# Patient Record
Sex: FEMALE | Race: WHITE | Employment: UNEMPLOYED | URBAN - METROPOLITAN AREA
[De-identification: names, ages, dates, MRNs, and addresses within clinical notes are randomized per-mention and may not be internally consistent; named-entity substitution may affect disease eponyms.]

---

## 2017-12-30 ENCOUNTER — HOSPITAL ENCOUNTER (EMERGENCY)
Age: 57
Discharge: HOME | End: 2017-12-30
Attending: EMERGENCY MEDICINE
Payer: MEDICARE

## 2017-12-30 VITALS
BODY MASS INDEX: 22.32 KG/M2 | DIASTOLIC BLOOD PRESSURE: 76 MMHG | TEMPERATURE: 98.3 F | HEART RATE: 63 BPM | SYSTOLIC BLOOD PRESSURE: 117 MMHG | RESPIRATION RATE: 16 BRPM | OXYGEN SATURATION: 98 % | WEIGHT: 126 LBS | HEIGHT: 63 IN

## 2017-12-30 DIAGNOSIS — R21 RASH AND OTHER NONSPECIFIC SKIN ERUPTION: Primary | ICD-10-CM

## 2017-12-30 PROCEDURE — 99283 EMERGENCY DEPT VISIT LOW MDM: CPT

## 2017-12-30 PROCEDURE — 74011250637 HC RX REV CODE- 250/637: Performed by: EMERGENCY MEDICINE

## 2017-12-30 RX ORDER — MUPIROCIN 20 MG/G
OINTMENT TOPICAL 2 TIMES DAILY
Qty: 22 G | Refills: 0 | Status: ON HOLD | OUTPATIENT
Start: 2017-12-30 | End: 2018-02-14

## 2017-12-30 RX ORDER — MUPIROCIN 20 MG/G
OINTMENT TOPICAL
Status: COMPLETED | OUTPATIENT
Start: 2017-12-30 | End: 2017-12-30

## 2017-12-30 RX ADMIN — MUPIROCIN: 20 OINTMENT TOPICAL at 12:55

## 2017-12-30 NOTE — ED PROVIDER NOTES
HPI Comments: 61 y.o. female with past medical history significant for hypercholesterolemia, asthma, and HTN who presents from home with chief complaint of a skin problem. Pt reports she was discharged yesterday after a 5-day admission for diverticulitis. Last night she scratched the skin over her abdominal LLQ when the \"skin came up\" over her LLQ which began draining. She also c/o recent cough. When asked about previous abdominal surgery, pt notes she had a tummytuck 10 years ago. Pt denies recent surgery. There are no other acute medical concerns at this time. PCP: Saritha Ba MD    Note written by Vonda Grider, as dictated by Adolfo Whaley MD 11:46 AM    The history is provided by the patient. Past Medical History:   Diagnosis Date    Abdominal pain 5/3/2013    Anxiety     Arthritis     Asthma     Depression     Hypercholesterolemia     Hypertension     PRIOR TO GASTRIC BYPASS    Joint pain     Kidney stones     Murmur, heart     Muscle ache     and pain    Nausea & vomiting     Other ill-defined conditions(799.89) 2013    \"BAD BRONCHITIS & PNEUMONIA\"    Other ill-defined conditions(799.89)     MITRAL VALVE PROLAPSE. DX. WHEN LITTLE.  Psychiatric disorder     DEPRESSION. BIPOLAR.  Status following gastric bypass for weight loss 5/3/2013    In  in [place], [place] with Dr. Derek Bojorquez.  Trouble in sleeping     Unspecified adverse effect of anesthesia     \"WOKE  [address] SO TERRIFIED\"    Unspecified adverse effect of anesthesia     LAST COUPLE SURGERIES, FIBEROPTIC USED. THROAT HAS CURVE.      Unspecified adverse effect of anesthesia     states need fibro optic for intubation due to 'curature in throat'       Past Surgical History:   Procedure Laterality Date    HX  SECTION       x 2    HX CHOLECYSTECTOMY      HX GASTRIC BYPASS  mid     in [place] HX GI  13    lap SBR w/ reconstruction of Jej., resection of peritoneal mass by Dr Aiden Houser      X 3 HERNIAS REPAIRED. UNSURE WHICH KIND.  HX ORTHOPAEDIC      RIGHT KNEE SURGERY AT AGE 11 YRS.    HX ORTHOPAEDIC      LT ANKLE FX. REPAIR. 6 SCREWS    HX OTHER SURGICAL      \"strangled bowel\"    HX OTHER SURGICAL      ABDOMINOPLASTY WITH INTERCEPTED HERNIAS X 4-5 DONE WITH MESH PLACEMENT    NEUROLOGICAL PROCEDURE UNLISTED  2011    CERVICAL FUSION. \"UNABLE TO MOVE HEAD SIDE TO SIDE, UP OR BACK\" PER PT. Family History:   Problem Relation Age of Onset    Cancer Mother      breast    Post-op Nausea/Vomiting Mother     Depression Mother     Cancer Father      prostate    Heart Disease Brother      MI    Anesth Problems Neg Hx        Social History     Social History    Marital status: SINGLE     Spouse name: N/A    Number of children: N/A    Years of education: N/A     Occupational History    Not on file. Social History Main Topics    Smoking status: Never Smoker    Smokeless tobacco: Never Used    Alcohol use No    Drug use: No    Sexual activity: Not on file     Other Topics Concern    Not on file     Social History Narrative         ALLERGIES: Latex; Erythromycin; Morphine; Pcn [penicillins]; Tape [adhesive]; and Tetracycline    Review of Systems   Constitutional: Negative for chills and fever. HENT: Negative for sore throat. Respiratory: Negative for cough and shortness of breath. Cardiovascular: Negative for chest pain. Gastrointestinal: Negative for abdominal pain and vomiting. Genitourinary: Negative for dysuria. Musculoskeletal: Negative for back pain. Skin: Positive for color change and wound. Negative for rash. Neurological: Negative for syncope and headaches. Psychiatric/Behavioral: Negative for confusion. All other systems reviewed and are negative.       Vitals:    12/30/17 1107   BP: 119/78   Pulse: 92   Resp: 18   Temp: 97.8 °F (36.6 °C)   SpO2: 98%   Weight: 57.2 kg (126 lb)   Height: 5' 3\" (1.6 m)            Physical Exam   Constitutional: She is oriented to person, place, and time. She appears well-developed and well-nourished. No distress. HENT:   Head: Normocephalic and atraumatic. Eyes: Pupils are equal, round, and reactive to light. Neck: Normal range of motion. Neck supple. Cardiovascular: Normal rate, regular rhythm and normal heart sounds. Exam reveals no gallop and no friction rub. No murmur heard. Pulmonary/Chest: Effort normal and breath sounds normal. No respiratory distress. She has no wheezes. Abdominal: Soft. Bowel sounds are normal. She exhibits no distension. There is no tenderness. There is no rebound and no guarding. Well headled scar on    Musculoskeletal: Normal range of motion. Neurological: She is alert and oriented to person, place, and time. Skin: Skin is warm. She is not diaphoretic. There is erythema. 3 x 6 cm blistering lesion over LLQ with surrounding erythema. and clear fluid drainage. Psychiatric: She has a normal mood and affect. Her behavior is normal. Judgment and thought content normal.   Nursing note and vitals reviewed. Note written by Vonda Cabral, as dictated by Katheryn DeL a Vega MD 11:54 AM        [facility]  ED Course   This is a 59-year-old female with past medical history, review of systems, physical exam as above, presenting with complaints of rash on the left lower extremity, characterized as blistering, leaking fluid. She states she was discharged out of the hospital yesterday, after stay for diverticulitis, extensive abdominal surgical history including gastric bypass. He denies presence of rash prior to going to bed last night but woke up with approximately 3 x 6 cm area of blistering rash this morning, as shown in the chart. Physical exam unremarkable for rash as photographed, erythematous, nonblanching, without other areas of involvement, no oral involvement, patient noted to be afebrile, without tachycardia.  Area of rash and blister that has opened does not appear to involve previous surgical scars. Differential includes dermatitis, Michael-Larry syndrome, toxic shock syndrome. Patient has been receiving IV levaquin and metronidazole for 5d, now converted to PO. Will advise patient to DC levaquin and lamotrigine, shadi the rash with a surgical pen, offer strict return precautions for worsening rash of mucous membrane involvement.     Procedures

## 2017-12-30 NOTE — DISCHARGE INSTRUCTIONS
Rash: Care Instructions  Your Care Instructions  A rash is any irritation or inflammation of the skin. Rashes have many possible causes, including allergy, infection, illness, heat, and emotional stress. Follow-up care is a key part of your treatment and safety. Be sure to make and go to all appointments, and call your doctor if you are having problems. It's also a good idea to know your test results and keep a list of the medicines you take. How can you care for yourself at home? · Wash the area with water only. Soap can make dryness and itching worse. Pat dry. · Put cold, wet cloths on the rash to reduce itching. · Keep cool, and stay out of the sun. · Leave the rash open to the air as much of the time as possible. · Sometimes petroleum jelly (Vaseline) can help relieve the discomfort caused by a rash. A moisturizing lotion, such as Cetaphil, also may help. Calamine lotion may help for rashes caused by contact with something (such as a plant or soap) that irritated the skin. Use it 3 or 4 times a day. · If your doctor prescribed a cream, use it as directed. If your doctor prescribed medicine, take it exactly as directed. · If your rash itches so badly that it interferes with your normal activities, take an over-the-counter antihistamine, such as diphenhydramine (Benadryl) or loratadine (Claritin). Read and follow all instructions on the label. When should you call for help? Call your doctor now or seek immediate medical care if:  ? · You have signs of infection, such as:  ¨ Increased pain, swelling, warmth, or redness. ¨ Red streaks leading from the area. ¨ Pus draining from the area. ¨ A fever. ? · You have joint pain along with the rash. ? Watch closely for changes in your health, and be sure to contact your doctor if:  ? · Your rash is changing or getting worse. For example, call if you have pain along with the rash, the rash is spreading, or you have new blisters.    ? · You do not get better after 1 week. Where can you learn more? Go to http://thea-master.info/. Enter L139 in the search box to learn more about \"Rash: Care Instructions. \"  Current as of: October 13, 2016  Content Version: 11.4  © 7874-1576 [employer]. Care instructions adapted under license by [employer] (which disclaims liability or warranty for this information). If you have questions about a medical condition or this instruction, always ask your healthcare professional. [address] any warranty or liability for your use of this information.

## 2018-01-08 ENCOUNTER — HOSPITAL ENCOUNTER (OUTPATIENT)
Dept: CT IMAGING | Age: 58
Discharge: HOME | End: 2018-01-08
Attending: COLON & RECTAL SURGERY
Payer: MEDICARE

## 2018-01-08 DIAGNOSIS — K57.32 DIVERTICULITIS, COLON: ICD-10-CM

## 2018-01-08 PROCEDURE — 74011000258 HC RX REV CODE- 258: Performed by: COLON & RECTAL SURGERY

## 2018-01-08 PROCEDURE — 74177 CT ABD & PELVIS W/CONTRAST: CPT

## 2018-01-08 PROCEDURE — 74011636320 HC RX REV CODE- 636/320: Performed by: COLON & RECTAL SURGERY

## 2018-01-08 RX ORDER — SODIUM CHLORIDE 0.9 % (FLUSH) 0.9 %
10 SYRINGE (ML) INJECTION
Status: COMPLETED | OUTPATIENT
Start: 2018-01-08 | End: 2018-01-08

## 2018-01-08 RX ADMIN — IOPAMIDOL 100 ML: 755 INJECTION, SOLUTION INTRAVENOUS at 18:34

## 2018-01-08 RX ADMIN — SODIUM CHLORIDE 100 ML: 900 INJECTION, SOLUTION INTRAVENOUS at 18:34

## 2018-01-08 RX ADMIN — IOHEXOL 50 ML: 240 INJECTION, SOLUTION INTRATHECAL; INTRAVASCULAR; INTRAVENOUS; ORAL at 18:34

## 2018-01-08 RX ADMIN — Medication 10 ML: at 18:34

## 2018-02-12 ENCOUNTER — APPOINTMENT (OUTPATIENT)
Dept: CT IMAGING | Age: 58
End: 2018-02-12
Attending: PHYSICIAN ASSISTANT
Payer: MEDICARE

## 2018-02-12 ENCOUNTER — HOSPITAL ENCOUNTER (EMERGENCY)
Age: 58
Discharge: HOME | End: 2018-02-12
Attending: EMERGENCY MEDICINE | Admitting: EMERGENCY MEDICINE
Payer: MEDICARE

## 2018-02-12 VITALS
HEART RATE: 73 BPM | RESPIRATION RATE: 14 BRPM | OXYGEN SATURATION: 100 % | HEIGHT: 65 IN | TEMPERATURE: 97.5 F | DIASTOLIC BLOOD PRESSURE: 62 MMHG | WEIGHT: 118 LBS | BODY MASS INDEX: 19.66 KG/M2 | SYSTOLIC BLOOD PRESSURE: 126 MMHG

## 2018-02-12 DIAGNOSIS — K59.00 CONSTIPATION, UNSPECIFIED CONSTIPATION TYPE: Primary | ICD-10-CM

## 2018-02-12 LAB
ALBUMIN SERPL-MCNC: 3.8 G/DL (ref 3.5–5)
ALBUMIN/GLOB SERPL: 1.2 {RATIO} (ref 1.1–2.2)
ALP SERPL-CCNC: 76 U/L (ref 45–117)
ALT SERPL-CCNC: 14 U/L (ref 12–78)
ANION GAP SERPL CALC-SCNC: 9 MMOL/L (ref 5–15)
APPEARANCE UR: CLEAR
AST SERPL-CCNC: 21 U/L (ref 15–37)
BACTERIA URNS QL MICRO: NEGATIVE /HPF
BASOPHILS # BLD: 0 K/UL (ref 0–0.1)
BASOPHILS NFR BLD: 1 % (ref 0–1)
BILIRUB SERPL-MCNC: 0.3 MG/DL (ref 0.2–1)
BILIRUB UR QL: NEGATIVE
BUN SERPL-MCNC: 10 MG/DL (ref 6–20)
BUN/CREAT SERPL: 13 (ref 12–20)
CALCIUM SERPL-MCNC: 8.8 MG/DL (ref 8.5–10.1)
CHLORIDE SERPL-SCNC: 106 MMOL/L (ref 97–108)
CO2 SERPL-SCNC: 25 MMOL/L (ref 21–32)
COLOR UR: ABNORMAL
CREAT SERPL-MCNC: 0.78 MG/DL (ref 0.55–1.02)
DIFFERENTIAL METHOD BLD: NORMAL
EOSINOPHIL # BLD: 0.1 K/UL (ref 0–0.4)
EOSINOPHIL NFR BLD: 1 % (ref 0–7)
EPITH CASTS URNS QL MICRO: ABNORMAL /LPF
ERYTHROCYTE [DISTWIDTH] IN BLOOD BY AUTOMATED COUNT: 13.9 % (ref 11.5–14.5)
GLOBULIN SER CALC-MCNC: 3.2 G/DL (ref 2–4)
GLUCOSE SERPL-MCNC: 91 MG/DL (ref 65–100)
GLUCOSE UR STRIP.AUTO-MCNC: NEGATIVE MG/DL
HCT VFR BLD AUTO: 38.5 % (ref 35–47)
HGB BLD-MCNC: 12.1 G/DL (ref 11.5–16)
HGB UR QL STRIP: ABNORMAL
HYALINE CASTS URNS QL MICRO: ABNORMAL /LPF (ref 0–5)
IMM GRANULOCYTES # BLD: 0 K/UL (ref 0–0.04)
IMM GRANULOCYTES NFR BLD AUTO: 0 % (ref 0–0.5)
KETONES UR QL STRIP.AUTO: NEGATIVE MG/DL
LEUKOCYTE ESTERASE UR QL STRIP.AUTO: NEGATIVE
LIPASE SERPL-CCNC: 180 U/L (ref 73–393)
LYMPHOCYTES # BLD: 1.7 K/UL (ref 0.8–3.5)
LYMPHOCYTES NFR BLD: 27 % (ref 12–49)
MCH RBC QN AUTO: 27.4 PG (ref 26–34)
MCHC RBC AUTO-ENTMCNC: 31.4 G/DL (ref 30–36.5)
MCV RBC AUTO: 87.1 FL (ref 80–99)
MONOCYTES # BLD: 0.5 K/UL (ref 0–1)
MONOCYTES NFR BLD: 7 % (ref 5–13)
NEUTS SEG # BLD: 4 K/UL (ref 1.8–8)
NEUTS SEG NFR BLD: 64 % (ref 32–75)
NITRITE UR QL STRIP.AUTO: NEGATIVE
NRBC # BLD: 0 K/UL (ref 0–0.01)
NRBC BLD-RTO: 0 PER 100 WBC
PH UR STRIP: 7.5 [PH] (ref 5–8)
PLATELET # BLD AUTO: 312 K/UL (ref 150–400)
PMV BLD AUTO: 11.9 FL (ref 8.9–12.9)
POTASSIUM SERPL-SCNC: 4 MMOL/L (ref 3.5–5.1)
PROT SERPL-MCNC: 7 G/DL (ref 6.4–8.2)
PROT UR STRIP-MCNC: NEGATIVE MG/DL
RBC # BLD AUTO: 4.42 M/UL (ref 3.8–5.2)
RBC #/AREA URNS HPF: ABNORMAL /HPF (ref 0–5)
SODIUM SERPL-SCNC: 140 MMOL/L (ref 136–145)
SP GR UR REFRACTOMETRY: 1.01 (ref 1–1.03)
UR CULT HOLD, URHOLD: NORMAL
UROBILINOGEN UR QL STRIP.AUTO: 0.2 EU/DL (ref 0.2–1)
WBC # BLD AUTO: 6.3 K/UL (ref 3.6–11)
WBC URNS QL MICRO: ABNORMAL /HPF (ref 0–4)

## 2018-02-12 PROCEDURE — 74011250636 HC RX REV CODE- 250/636: Performed by: PHYSICIAN ASSISTANT

## 2018-02-12 PROCEDURE — 36415 COLL VENOUS BLD VENIPUNCTURE: CPT | Performed by: PHYSICIAN ASSISTANT

## 2018-02-12 PROCEDURE — 83690 ASSAY OF LIPASE: CPT | Performed by: PHYSICIAN ASSISTANT

## 2018-02-12 PROCEDURE — 74011636320 HC RX REV CODE- 636/320: Performed by: RADIOLOGY

## 2018-02-12 PROCEDURE — 96361 HYDRATE IV INFUSION ADD-ON: CPT

## 2018-02-12 PROCEDURE — 81001 URINALYSIS AUTO W/SCOPE: CPT | Performed by: PHYSICIAN ASSISTANT

## 2018-02-12 PROCEDURE — 96374 THER/PROPH/DIAG INJ IV PUSH: CPT

## 2018-02-12 PROCEDURE — 80053 COMPREHEN METABOLIC PANEL: CPT | Performed by: PHYSICIAN ASSISTANT

## 2018-02-12 PROCEDURE — 96375 TX/PRO/DX INJ NEW DRUG ADDON: CPT

## 2018-02-12 PROCEDURE — 74177 CT ABD & PELVIS W/CONTRAST: CPT

## 2018-02-12 PROCEDURE — 85025 COMPLETE CBC W/AUTO DIFF WBC: CPT | Performed by: PHYSICIAN ASSISTANT

## 2018-02-12 PROCEDURE — 99283 EMERGENCY DEPT VISIT LOW MDM: CPT

## 2018-02-12 RX ORDER — HYDROMORPHONE HYDROCHLORIDE 2 MG/ML
0.5 INJECTION, SOLUTION INTRAMUSCULAR; INTRAVENOUS; SUBCUTANEOUS
Status: DISCONTINUED | OUTPATIENT
Start: 2018-02-12 | End: 2018-02-13 | Stop reason: HOSPADM

## 2018-02-12 RX ORDER — ONDANSETRON 2 MG/ML
4 INJECTION INTRAMUSCULAR; INTRAVENOUS
Status: COMPLETED | OUTPATIENT
Start: 2018-02-12 | End: 2018-02-12

## 2018-02-12 RX ORDER — KETOROLAC TROMETHAMINE 30 MG/ML
30 INJECTION, SOLUTION INTRAMUSCULAR; INTRAVENOUS
Status: COMPLETED | OUTPATIENT
Start: 2018-02-12 | End: 2018-02-12

## 2018-02-12 RX ORDER — POLYETHYLENE GLYCOL 3350 17 G/17G
17 POWDER, FOR SOLUTION ORAL DAILY
Qty: 119 G | Refills: 0 | Status: SHIPPED | OUTPATIENT
Start: 2018-02-12 | End: 2020-02-11

## 2018-02-12 RX ADMIN — ONDANSETRON 4 MG: 2 INJECTION INTRAMUSCULAR; INTRAVENOUS at 18:19

## 2018-02-12 RX ADMIN — KETOROLAC TROMETHAMINE 30 MG: 30 INJECTION, SOLUTION INTRAMUSCULAR at 18:19

## 2018-02-12 RX ADMIN — IOPAMIDOL 100 ML: 755 INJECTION, SOLUTION INTRAVENOUS at 19:06

## 2018-02-12 RX ADMIN — SODIUM CHLORIDE 1000 ML: 900 INJECTION, SOLUTION INTRAVENOUS at 18:19

## 2018-02-12 NOTE — ED PROVIDER NOTES
HPI Comments: Sol Menadr is a 61 y.o. female  who presents by private vehicle to ER with c/o Patient presents with:  Abdominal Pain. Patient presents with complaints of lower abdominal pain with intermittent diarrhea and constipation. Patient reports she was admitted back in December for Diverticulitis. Patient scheduled to have colonoscopy by Dr. Radha Tyson on Thursday. Denies fever or chills. She specifically denies any fevers, chills, nausea, vomiting, chest pain, shortness of breath, headache, rash, diarrhea,  urinary/bowel changes, sweating or weight loss. PCP: Salma Martell MD   PMHx significant for: Past Medical History:  5/3/2013: Abdominal pain  No date: Anxiety  No date: Arthritis  No date: Asthma  No date: Depression  No date: Hypercholesterolemia  No date: Hypertension      Comment: PRIOR TO GASTRIC BYPASS  No date: Joint pain  No date: Kidney stones  No date: Murmur, heart  No date: Muscle ache      Comment: and pain  No date: Nausea & vomiting  2013: Other ill-defined conditions(799.89)      Comment: \"BAD BRONCHITIS & PNEUMONIA\"  No date: Other ill-defined conditions(799.89)      Comment: MITRAL VALVE PROLAPSE. DX. WHEN LITTLE. No date: Psychiatric disorder      Comment: DEPRESSION. BIPOLAR.  5/3/2013: Status following gastric bypass for weight loss      Comment: In  in [place], [place] with Dr. Raoul Hawthorne. No date: Trouble in sleeping  No date: Unspecified adverse effect of anesthesia      Comment: \"WOKE  [address] SO TERRIFIED\"  No date: Unspecified adverse effect of anesthesia      Comment: LAST COUPLE SURGERIES, FIBEROPTIC USED. THROAT               HAS CURVE.    No date: Unspecified adverse effect of anesthesia      Comment: states need fibro optic for intubation due to                'curature in throat'   PSHx significant for: Past Surgical History:  No date: HX  SECTION      Comment:  x 2  No date: HX CHOLECYSTECTOMY  mid : HX GASTRIC BYPASS      Comment: in [place]  5/8/13: HX GI      Comment: lap SBR w/ reconstruction of Jej., resection                of peritoneal mass by Dr Radhames Azevedo  No date: HX HERNIA REPAIR      Comment: X 3 HERNIAS REPAIRED. UNSURE WHICH KIND. No date: HX ORTHOPAEDIC      Comment: RIGHT KNEE SURGERY AT AGE 11 YRS. No date: HX ORTHOPAEDIC      Comment: LT ANKLE FX. REPAIR. 6 SCREWS  No date: HX OTHER SURGICAL      Comment: \"strangled bowel\"  No date: HX OTHER SURGICAL      Comment: ABDOMINOPLASTY WITH INTERCEPTED HERNIAS X 4-5                DONE WITH MESH PLACEMENT  2011: NEUROLOGICAL PROCEDURE UNLISTED      Comment: CERVICAL FUSION. \"UNABLE TO MOVE HEAD SIDE TO                SIDE, UP OR BACK\" PER PT. Social Hx: Tobacco use: Smoking status: Never Smoker                                                              Smokeless status: Never Used                      ; EtOH use: The patient states she drinks 0  per week.; Illicit Drug use: Allergies:   -- Latex -- Itching and Other (comments)    --  Oozing sores, rash   -- Erythromycin -- Itching    --  WITH RASH & GI ISSUES. -- Morphine -- Itching    --  Tolerates hydromorphone, hydrocodone and oxycodone   -- Pcn (Penicillins) -- Rash and Itching   -- Tape (Adhesive) -- Rash and Other (comments)    --  Oozing sores   -- Tetracycline -- Itching    There are no other complaints, changes or physical findings at this time. Patient is a 61 y.o. female presenting with abdominal pain. The history is provided by the patient. Abdominal Pain    This is a recurrent problem. The current episode started more than 2 days ago. The problem occurs constantly. The problem has not changed since onset. The pain is located in the suprapubic region, LLQ and RLQ. The quality of the pain is sharp. The pain is at a severity of 8/10. The pain is severe. Associated symptoms include nausea and constipation.  Pertinent negatives include no anorexia, no fever, no belching, no flatus, no vomiting, no dysuria, no hematuria, no arthralgias, no myalgias, no trauma, no chest pain, no testicular pain and no back pain. Nothing worsens the pain. The pain is relieved by nothing. Past workup includes CT scan. Her past medical history is significant for diverticulitis. The patient's surgical history non-contributory. Past Medical History:   Diagnosis Date    Abdominal pain 5/3/2013    Anxiety     Arthritis     Asthma     Depression     Hypercholesterolemia     Hypertension     PRIOR TO GASTRIC BYPASS    Joint pain     Kidney stones     Murmur, heart     Muscle ache     and pain    Nausea & vomiting     Other ill-defined conditions(799.89) 2013    \"BAD BRONCHITIS & PNEUMONIA\"    Other ill-defined conditions(799.89)     MITRAL VALVE PROLAPSE. DX. WHEN LITTLE.  Psychiatric disorder     DEPRESSION. BIPOLAR.  Status following gastric bypass for weight loss 5/3/2013    In  in [place], [place] with Dr. Arlene Ha.  Trouble in sleeping     Unspecified adverse effect of anesthesia     \"WOKE  [address] SO TERRIFIED\"    Unspecified adverse effect of anesthesia     LAST COUPLE SURGERIES, FIBEROPTIC USED. THROAT HAS CURVE.  Unspecified adverse effect of anesthesia     states need fibro optic for intubation due to 'curature in throat'       Past Surgical History:   Procedure Laterality Date    HX  SECTION       x 2    HX CHOLECYSTECTOMY      HX GASTRIC BYPASS  mid     in [place] HX GI  13    lap SBR w/ reconstruction of Jej., resection of peritoneal mass by Dr Bobo Duong      X 3 HERNIAS REPAIRED. UNSURE WHICH KIND.     HX ORTHOPAEDIC      RIGHT KNEE SURGERY AT AGE 11 YRS.    HX ORTHOPAEDIC      LT ANKLE FX. REPAIR. 6 SCREWS    HX OTHER SURGICAL      \"strangled bowel\"    HX OTHER SURGICAL      ABDOMINOPLASTY WITH INTERCEPTED HERNIAS X 4-5 DONE WITH MESH PLACEMENT    NEUROLOGICAL PROCEDURE UNLISTED  2011    CERVICAL FUSION. \"UNABLE TO MOVE HEAD SIDE TO SIDE, UP OR BACK\" PER PT. Family History:   Problem Relation Age of Onset    Cancer Mother      breast    Post-op Nausea/Vomiting Mother     Depression Mother     Cancer Father      prostate    Heart Disease Brother      MI    Anesth Problems Neg Hx        Social History     Social History    Marital status: SINGLE     Spouse name: N/A    Number of children: N/A    Years of education: N/A     Occupational History    Not on file. Social History Main Topics    Smoking status: Never Smoker    Smokeless tobacco: Never Used    Alcohol use No    Drug use: No    Sexual activity: Not on file     Other Topics Concern    Not on file     Social History Narrative         ALLERGIES: Latex; Erythromycin; Morphine; Pcn [penicillins]; Tape [adhesive]; and Tetracycline    Review of Systems   Constitutional: Negative. Negative for fever. HENT: Negative. Eyes: Negative. Respiratory: Negative. Cardiovascular: Negative. Negative for chest pain. Gastrointestinal: Positive for abdominal pain, constipation and nausea. Negative for anorexia, flatus and vomiting. Endocrine: Negative. Genitourinary: Negative. Negative for dysuria, hematuria and testicular pain. Musculoskeletal: Negative. Negative for arthralgias, back pain and myalgias. Skin: Negative. Allergic/Immunologic: Negative. Neurological: Negative. Hematological: Negative. Psychiatric/Behavioral: Negative. All other systems reviewed and are negative. Vitals:    02/12/18 1713   BP: 148/74   Pulse: 92   Resp: 20   Temp: 97.5 °F (36.4 °C)   SpO2: 100%   Weight: 53.5 kg (118 lb)   Height: 5' 5\" (1.651 m)            Physical Exam   Constitutional: She is oriented to person, place, and time. She appears well-developed. HENT:   Head: Normocephalic and atraumatic.    Right Ear: External ear normal.   Left Ear: External ear normal. Nose: Nose normal.   Mouth/Throat: Oropharynx is clear and moist. No oropharyngeal exudate. Eyes: Conjunctivae, EOM and lids are normal. Right eye exhibits no discharge. Left eye exhibits no discharge. Neck: Normal range of motion. No tracheal deviation present. No thyromegaly present. Cardiovascular: Normal rate, regular rhythm, normal heart sounds and intact distal pulses. Pulmonary/Chest: Effort normal and breath sounds normal.   Abdominal: Soft. Normal appearance and bowel sounds are normal. There is tenderness in the suprapubic area and left lower quadrant. Non-surgical abdominal exam    Musculoskeletal: Normal range of motion. Neurological: She is alert and oriented to person, place, and time. Skin: Skin is warm and dry. Psychiatric: She has a normal mood and affect. Judgment normal.        MDM  Number of Diagnoses or Management Options  Constipation, unspecified constipation type:   Diagnosis management comments: Assesment/Plan- 61 y.o. Patient presents with:  Abdominal Pain  differential includes: gastritis, diverticulitis, constipation. Labs and imaging reviewed with ct showing constipation. Discussed results with patient. Patient encouraged to do bowel prep and follow up with Dr. Jhoan Hoskins as scheduled. Recommend GI follow up. Patient educated on reasons to return to the ED.          Amount and/or Complexity of Data Reviewed  Clinical lab tests: ordered and reviewed  Tests in the radiology section of CPT®: ordered and reviewed  Tests in the medicine section of CPT®: ordered and reviewed  Discuss the patient with other providers: yes (Attending- Dr. Tonya Flannery who agrees with plan and also saw patient.)          ED Course       Procedures

## 2018-02-12 NOTE — ED TRIAGE NOTES
Patient presents with 2-month history of abdominal pain with intermittent diarrhea. Patient has endoscopy and colonoscopy scheduled for Wednesday with Dr. Ana Larsen.

## 2018-02-13 RX ORDER — ZIPRASIDONE HYDROCHLORIDE 80 MG/1
80 CAPSULE ORAL
COMMUNITY

## 2018-02-13 NOTE — DISCHARGE INSTRUCTIONS
Constipation: Care Instructions  Your Care Instructions    Constipation means that you have a hard time passing stools (bowel movements). People pass stools from 3 times a day to once every 3 days. What is normal for you may be different. Constipation may occur with pain in the rectum and cramping. The pain may get worse when you try to pass stools. Sometimes there are small amounts of bright red blood on toilet paper or the surface of stools. This is because of enlarged veins near the rectum (hemorrhoids). A few changes in your diet and lifestyle may help you avoid ongoing constipation. Your doctor may also prescribe medicine to help loosen your stool. Some medicines can cause constipation. These include pain medicines and antidepressants. Tell your doctor about all the medicines you take. Your doctor may want to make a medicine change to ease your symptoms. Follow-up care is a key part of your treatment and safety. Be sure to make and go to all appointments, and call your doctor if you are having problems. It's also a good idea to know your test results and keep a list of the medicines you take. How can you care for yourself at home? · Drink plenty of fluids, enough so that your urine is light yellow or clear like water. If you have kidney, heart, or liver disease and have to limit fluids, talk with your doctor before you increase the amount of fluids you drink. · Include high-fiber foods in your diet each day. These include fruits, vegetables, beans, and whole grains. · Get at least 30 minutes of exercise on most days of the week. Walking is a good choice. You also may want to do other activities, such as running, swimming, cycling, or playing tennis or team sports. · Take a fiber supplement, such as Citrucel or Metamucil, every day. Read and follow all instructions on the label. · Schedule time each day for a bowel movement. A daily routine may help.  Take your time having your bowel movement. · Support your feet with a small step stool when you sit on the toilet. This helps flex your hips and places your pelvis in a squatting position. · Your doctor may recommend an over-the-counter laxative to relieve your constipation. Examples are Milk of Magnesia and MiraLax. Read and follow all instructions on the label. Do not use laxatives on a long-term basis. When should you call for help? Call your doctor now or seek immediate medical care if:  ? · You have new or worse belly pain. ? · You have new or worse nausea or vomiting. ? · You have blood in your stools. ? Watch closely for changes in your health, and be sure to contact your doctor if:  ? · Your constipation is getting worse. ? · You do not get better as expected. Where can you learn more? Go to http://thea-master.info/. Enter 21 [phone] in the search box to learn more about \"Constipation: Care Instructions. \"  Current as of: March 20, 2017  Content Version: 11.4  © 9548-9209 [employer]. Care instructions adapted under license by [employer] (which disclaims liability or warranty for this information). If you have questions about a medical condition or this instruction, always ask your healthcare professional. [address] any warranty or liability for your use of this information. We hope that we have addressed all of your medical concerns. The examination and treatment you received in the Emergency Department were for an emergent problem and were not intended as complete care. It is important that you follow up with your healthcare provider(s) for ongoing care. If your symptoms worsen or do not improve as expected, and you are unable to reach your usual health care provider(s), you should return to the Emergency Department.       Today's healthcare is undergoing tremendous change, and patient satisfaction surveys are one of the many tools to assess the quality of medical care. You may receive a survey from the [employer] regarding your experience in the Emergency Department. I hope that your experience has been completely positive, particularly the medical care that I provided. As such, please participate in the survey; anything less than excellent does not meet my expectations or intentions. [address] and [address] [place] [address] participate in nationally recognized quality of care measures. If your blood pressure is greater than 120/80, as reported below, we urge that you seek medical care to address the potential of high blood pressure, commonly known as hypertension. Hypertension can be hereditary or can be caused by certain medical conditions, pain, stress, or \"white coat syndrome. \"       Please make an appointment with your health care provider(s) for follow up of your Emergency Department visit. VITALS:   Patient Vitals for the past 8 hrs:   Temp Pulse Resp BP SpO2   02/12/18 1713 97.5 °F (36.4 °C) 92 20 148/74 100 %          Thank you for allowing us to provide you with medical care today. We realize that you have many choices for your emergency care needs. Please choose us in the future for any continued health care needs. Ana Alvarez, [address].   Office: [phone]            Recent Results (from the past 24 hour(s))   CBC WITH AUTOMATED DIFF    Collection Time: 02/12/18  6:04 PM   Result Value Ref Range    WBC 6.3 3.6 - 11.0 K/uL    RBC 4.42 3.80 - 5.20 M/uL    HGB 12.1 11.5 - 16.0 g/dL    HCT 38.5 35.0 - 47.0 %    MCV 87.1 80.0 - 99.0 FL    MCH 27.4 26.0 - 34.0 PG    MCHC 31.4 30.0 - 36.5 g/dL    RDW 13.9 11.5 - 14.5 %    PLATELET 398 921 - 744 K/uL    MPV 11.9 8.9 - 12.9 FL    NRBC 0.0 0  WBC    ABSOLUTE NRBC 0.00 0.00 - 0.01 K/uL    NEUTROPHILS 64 32 - 75 %    LYMPHOCYTES 27 12 - 49 %    MONOCYTES 7 5 - 13 %    EOSINOPHILS 1 0 - 7 % BASOPHILS 1 0 - 1 %    IMMATURE GRANULOCYTES 0 0.0 - 0.5 %    ABS. NEUTROPHILS 4.0 1.8 - 8.0 K/UL    ABS. LYMPHOCYTES 1.7 0.8 - 3.5 K/UL    ABS. MONOCYTES 0.5 0.0 - 1.0 K/UL    ABS. EOSINOPHILS 0.1 0.0 - 0.4 K/UL    ABS. BASOPHILS 0.0 0.0 - 0.1 K/UL    ABS. IMM. GRANS. 0.0 0.00 - 0.04 K/UL    DF AUTOMATED     METABOLIC PANEL, COMPREHENSIVE    Collection Time: 02/12/18  6:04 PM   Result Value Ref Range    Sodium 140 136 - 145 mmol/L    Potassium 4.0 3.5 - 5.1 mmol/L    Chloride 106 97 - 108 mmol/L    CO2 25 21 - 32 mmol/L    Anion gap 9 5 - 15 mmol/L    Glucose 91 65 - 100 mg/dL    BUN 10 6 - 20 MG/DL    Creatinine 0.78 0.55 - 1.02 MG/DL    BUN/Creatinine ratio 13 12 - 20      GFR est AA >60 >60 ml/min/1.73m2    GFR est non-AA >60 >60 ml/min/1.73m2    Calcium 8.8 8.5 - 10.1 MG/DL    Bilirubin, total 0.3 0.2 - 1.0 MG/DL    ALT (SGPT) 14 12 - 78 U/L    AST (SGOT) 21 15 - 37 U/L    Alk. phosphatase 76 45 - 117 U/L    Protein, total 7.0 6.4 - 8.2 g/dL    Albumin 3.8 3.5 - 5.0 g/dL    Globulin 3.2 2.0 - 4.0 g/dL    A-G Ratio 1.2 1.1 - 2.2     LIPASE    Collection Time: 02/12/18  6:04 PM   Result Value Ref Range    Lipase 180 73 - 393 U/L   URINALYSIS W/MICROSCOPIC    Collection Time: 02/12/18  7:57 PM   Result Value Ref Range    Color YELLOW/STRAW      Appearance CLEAR CLEAR      Specific gravity 1.015 1.003 - 1.030      pH (UA) 7.5 5.0 - 8.0      Protein NEGATIVE  NEG mg/dL    Glucose NEGATIVE  NEG mg/dL    Ketone NEGATIVE  NEG mg/dL    Bilirubin NEGATIVE  NEG      Blood TRACE (A) NEG      Urobilinogen 0.2 0.2 - 1.0 EU/dL    Nitrites NEGATIVE  NEG      Leukocyte Esterase NEGATIVE  NEG      WBC 0-4 0 - 4 /hpf    RBC 0-5 0 - 5 /hpf    Epithelial cells FEW FEW /lpf    Bacteria NEGATIVE  NEG /hpf    Hyaline cast 0-2 0 - 5 /lpf   URINE CULTURE HOLD SAMPLE    Collection Time: 02/12/18  7:57 PM   Result Value Ref Range    Urine culture hold        URINE ON HOLD IN MICROBIOLOGY DEPT FOR 3 DAYS.  IF UNPRESERVED URINE IS SUBMITTED, IT CANNOT BE USED FOR ADDITIONAL TESTING AFTER 24 HRS, RECOLLECTION WILL BE REQUIRED. Ct Abd Pelv W Cont    Result Date: 2/12/2018  EXAM:  CT ABD PELV W CONT INDICATION: 2 month history of lower abdominal pain, diarrhea and history of diverticulitis , history of gastric bypass and cholecystectomy in the past. COMPARISON: 1/8/2018 CONTRAST:  100 mL of Isovue-370. TECHNIQUE: Following the uneventful intravenous administration of contrast, thin axial images were obtained through the abdomen and pelvis. Coronal and sagittal reconstructions were generated. Oral contrast was not administered. CT dose reduction was achieved through use of a standardized protocol tailored for this examination and automatic exposure control for dose modulation. FINDINGS: LUNG BASES: Clear. INCIDENTALLY IMAGED HEART AND MEDIASTINUM: Unremarkable. LIVER: No mass or biliary dilatation. Mild prominence of intrahepatic and extrahepatic ducts is likely related to cholecystectomy. GALLBLADDER: Surgically absent. SPLEEN: No mass. PANCREAS: No mass or ductal dilatation. ADRENALS: Unremarkable. KIDNEYS: No mass, calculus, or hydronephrosis. STOMACH: Ptosis gastric bypass surgery, with no obvious obstruction of the ET [place] loop or at the jejunojejunostomy. No obvious wall thickening or inflammation. SMALL BOWEL: No dilatation or wall thickening. There is no obvious internal herniation. COLON: No dilatation or wall thickening. Moderately large amount of retained fecal material without alejandro distention. APPENDIX: Unremarkable. PERITONEUM: No ascites or pneumoperitoneum. RETROPERITONEUM: No lymphadenopathy or aortic aneurysm. REPRODUCTIVE ORGANS: Unremarkable for age. URINARY BLADDER: No mass or calculus. BONES: No destructive bone lesion. Stable upper lumbar compression deformity.  ADDITIONAL COMMENTS: The previously described fat attenuation mass in the left upper quadrant anteriorly is again noted,1 and is slightly smaller, measuring 4.0 x 2.5 cm in size, compared to similar measurements of 4.6 x 2.5 cm. Incidentally noted herniorrhaphy clips in the lower pelvic wall. IMPRESSION: 1. No acute abdominal or pelvic abnormality to correspond with abdominal pain. 2. 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 3. Moderately large amount of retained fecal material without alejandro colonic distention. 4. Otherwise stable incidental and postoperative changes.

## 2018-02-13 NOTE — ED NOTES
Order for dilaudid received. Pt reports she drove herself here and cannot find anyone to pick her up. States she will be driving herself home once they find out what is wrong. Informed pt she cannot drive home immediately if we give her the dilaudid. Will wait for CT results before giving dilaudid. Kavon Foley [place] aware.

## 2018-02-13 NOTE — ED NOTES
Report received from Romie, [facility] [place] [place]. Assumed care of pt. Bed locked and in low position. Pt instructed on how to call for assistance while on cruz stretcher with understanding being verbalized. Using AIDET-Introduced self as Primary RN and plan discussed with pt with understanding was verbalized. Pt denies additional complaints at this time. Patient advised that medical information will be discussed and it is their own responsibility to tell nurse if such conversation should not take place in the presence of visitors. Pt verbalizes understanding.

## 2018-02-14 ENCOUNTER — ANESTHESIA EVENT (OUTPATIENT)
Dept: ENDOSCOPY | Age: 58
End: 2018-02-14
Payer: MEDICARE

## 2018-02-14 ENCOUNTER — ANESTHESIA (OUTPATIENT)
Dept: ENDOSCOPY | Age: 58
End: 2018-02-14
Payer: MEDICARE

## 2018-02-14 ENCOUNTER — HOSPITAL ENCOUNTER (OUTPATIENT)
Age: 58
Setting detail: OUTPATIENT SURGERY
Discharge: HOME | End: 2018-02-14
Attending: SPECIALIST | Admitting: SPECIALIST
Payer: MEDICARE

## 2018-02-14 VITALS
OXYGEN SATURATION: 100 % | HEART RATE: 83 BPM | WEIGHT: 118 LBS | RESPIRATION RATE: 14 BRPM | DIASTOLIC BLOOD PRESSURE: 59 MMHG | TEMPERATURE: 97.9 F | HEIGHT: 65 IN | SYSTOLIC BLOOD PRESSURE: 105 MMHG | BODY MASS INDEX: 19.66 KG/M2

## 2018-02-14 PROCEDURE — 88305 TISSUE EXAM BY PATHOLOGIST: CPT | Performed by: SPECIALIST

## 2018-02-14 PROCEDURE — 74011250636 HC RX REV CODE- 250/636: Performed by: PHYSICIAN ASSISTANT

## 2018-02-14 PROCEDURE — 74011250636 HC RX REV CODE- 250/636

## 2018-02-14 PROCEDURE — 76060000031 HC ANESTHESIA FIRST 0.5 HR: Performed by: SPECIALIST

## 2018-02-14 PROCEDURE — 76040000019: Performed by: SPECIALIST

## 2018-02-14 PROCEDURE — 74011000250 HC RX REV CODE- 250

## 2018-02-14 PROCEDURE — 74011250637 HC RX REV CODE- 250/637: Performed by: PHYSICIAN ASSISTANT

## 2018-02-14 PROCEDURE — 77030009426 HC FCPS BIOP ENDOSC BSC -B: Performed by: SPECIALIST

## 2018-02-14 RX ORDER — PANTOPRAZOLE SODIUM 20 MG/1
20 TABLET, DELAYED RELEASE ORAL
Qty: 60 TAB | Refills: 0 | Status: SHIPPED | OUTPATIENT
Start: 2018-02-14 | End: 2018-04-15

## 2018-02-14 RX ORDER — PROPOFOL 10 MG/ML
INJECTION, EMULSION INTRAVENOUS AS NEEDED
Status: DISCONTINUED | OUTPATIENT
Start: 2018-02-14 | End: 2018-02-14 | Stop reason: HOSPADM

## 2018-02-14 RX ORDER — PROPOFOL 10 MG/ML
INJECTION, EMULSION INTRAVENOUS
Status: DISCONTINUED | OUTPATIENT
Start: 2018-02-14 | End: 2018-02-14 | Stop reason: HOSPADM

## 2018-02-14 RX ORDER — SODIUM CHLORIDE 9 MG/ML
50 INJECTION, SOLUTION INTRAVENOUS CONTINUOUS
Status: DISCONTINUED | OUTPATIENT
Start: 2018-02-14 | End: 2018-02-14 | Stop reason: HOSPADM

## 2018-02-14 RX ORDER — SUCRALFATE 1 G/1
1 TABLET ORAL 4 TIMES DAILY
Qty: 56 TAB | Refills: 0 | Status: SHIPPED | OUTPATIENT
Start: 2018-02-14 | End: 2018-02-28

## 2018-02-14 RX ORDER — FLUMAZENIL 0.1 MG/ML
0.2 INJECTION INTRAVENOUS
Status: DISCONTINUED | OUTPATIENT
Start: 2018-02-14 | End: 2018-02-14 | Stop reason: HOSPADM

## 2018-02-14 RX ORDER — DEXTROMETHORPHAN/PSEUDOEPHED 2.5-7.5/.8
1.2 DROPS ORAL
Status: DISCONTINUED | OUTPATIENT
Start: 2018-02-14 | End: 2018-02-14 | Stop reason: HOSPADM

## 2018-02-14 RX ORDER — EPINEPHRINE 0.1 MG/ML
1 INJECTION INTRACARDIAC; INTRAVENOUS
Status: DISCONTINUED | OUTPATIENT
Start: 2018-02-14 | End: 2018-02-14 | Stop reason: HOSPADM

## 2018-02-14 RX ORDER — SODIUM CHLORIDE 9 MG/ML
INJECTION, SOLUTION INTRAVENOUS
Status: DISCONTINUED | OUTPATIENT
Start: 2018-02-14 | End: 2018-02-14 | Stop reason: HOSPADM

## 2018-02-14 RX ORDER — NALOXONE HYDROCHLORIDE 0.4 MG/ML
0.4 INJECTION, SOLUTION INTRAMUSCULAR; INTRAVENOUS; SUBCUTANEOUS
Status: DISCONTINUED | OUTPATIENT
Start: 2018-02-14 | End: 2018-02-14 | Stop reason: HOSPADM

## 2018-02-14 RX ORDER — PANTOPRAZOLE SODIUM 20 MG/1
20 TABLET, DELAYED RELEASE ORAL
Qty: 60 TAB | Refills: 0 | Status: SHIPPED | OUTPATIENT
Start: 2018-02-14

## 2018-02-14 RX ORDER — ONDANSETRON 2 MG/ML
INJECTION INTRAMUSCULAR; INTRAVENOUS AS NEEDED
Status: DISCONTINUED | OUTPATIENT
Start: 2018-02-14 | End: 2018-02-14 | Stop reason: HOSPADM

## 2018-02-14 RX ORDER — LIDOCAINE HYDROCHLORIDE 20 MG/ML
INJECTION, SOLUTION EPIDURAL; INFILTRATION; INTRACAUDAL; PERINEURAL AS NEEDED
Status: DISCONTINUED | OUTPATIENT
Start: 2018-02-14 | End: 2018-02-14 | Stop reason: HOSPADM

## 2018-02-14 RX ORDER — MIDAZOLAM HYDROCHLORIDE 1 MG/ML
.25-5 INJECTION, SOLUTION INTRAMUSCULAR; INTRAVENOUS AS NEEDED
Status: DISCONTINUED | OUTPATIENT
Start: 2018-02-14 | End: 2018-02-14 | Stop reason: HOSPADM

## 2018-02-14 RX ORDER — FENTANYL CITRATE 50 UG/ML
25 INJECTION, SOLUTION INTRAMUSCULAR; INTRAVENOUS AS NEEDED
Status: DISCONTINUED | OUTPATIENT
Start: 2018-02-14 | End: 2018-02-14 | Stop reason: HOSPADM

## 2018-02-14 RX ORDER — ATROPINE SULFATE 0.1 MG/ML
0.5 INJECTION INTRAVENOUS
Status: DISCONTINUED | OUTPATIENT
Start: 2018-02-14 | End: 2018-02-14 | Stop reason: HOSPADM

## 2018-02-14 RX ADMIN — SODIUM CHLORIDE: 9 INJECTION, SOLUTION INTRAVENOUS at 09:13

## 2018-02-14 RX ADMIN — PROPOFOL 75 MCG/KG/MIN: 10 INJECTION, EMULSION INTRAVENOUS at 09:05

## 2018-02-14 RX ADMIN — LIDOCAINE HYDROCHLORIDE 60 MG: 20 INJECTION, SOLUTION EPIDURAL; INFILTRATION; INTRACAUDAL; PERINEURAL at 09:04

## 2018-02-14 RX ADMIN — MIDAZOLAM HYDROCHLORIDE 2 MG: 1 INJECTION, SOLUTION INTRAMUSCULAR; INTRAVENOUS at 08:45

## 2018-02-14 RX ADMIN — ONDANSETRON 4 MG: 2 INJECTION INTRAMUSCULAR; INTRAVENOUS at 08:47

## 2018-02-14 RX ADMIN — SODIUM CHLORIDE: 9 INJECTION, SOLUTION INTRAVENOUS at 08:30

## 2018-02-14 RX ADMIN — SODIUM CHLORIDE 50 ML/HR: 900 INJECTION, SOLUTION INTRAVENOUS at 08:34

## 2018-02-14 RX ADMIN — PROPOFOL 50 MG: 10 INJECTION, EMULSION INTRAVENOUS at 09:05

## 2018-02-14 RX ADMIN — SIMETHICONE 80 MG: 20 SUSPENSION/ DROPS ORAL at 09:38

## 2018-02-14 NOTE — PERIOP NOTES
Procedure being performed under MAC; NASIMA Small at bedside monitoring patient at 9815. See anesthesia notes. Endoscope was pre-cleaned at bedside immediately following procedure by Katherine Carlson at 4333. Care of patient assumed from the anesthesia provider at 9616. Patient tolerated procedure well. Abdomen remains soft and non tender post procedure, no complaints or indication of discomfort noted at this time. See anesthesia note. Patient transferred to Endoscopy Recovery and report given to recovery nurse.

## 2018-02-14 NOTE — ROUTINE PROCESS
Ang Weiner  1960  [id number]    Situation:  Verbal report received from: Xavier Alvarado RN  Procedure: Procedure(s):  COLONOSCOPY, EGD  ESOPHAGOGASTRODUODENOSCOPY (EGD)  ESOPHAGOGASTRODUODENAL (EGD) BIOPSY    Background:    Preoperative diagnosis: LEFT SIDED ABDOMINAL PAIN   Postoperative diagnosis: Ulcer of jejunum, gastric bypass; Diverticulosis, Iinternal Hemorrhoids      :  Dr. Tiny Lazo  Assistant(s): Endoscopy Technician-1: Lucian Soulier  Endoscopy RN-1: Xavier Alvarado RN    Specimens:   ID Type Source Tests Collected by Time Destination   1 : Jejunum Bxs. Preservative   Divine Faith MD 2/14/2018 3780 Pathology     H. Pylori  no    Assessment:  Intra-procedure medications     Anesthesia gave intra-procedure sedation and medications, see anesthesia flow sheet yes    Intravenous fluids: NS@ KVO     Vital signs stable     Abdominal assessment: round and soft     Recommendation:  Discharge patient per MD order. Family or Friend   Permission to share finding with family or friend yes    Endoscopy discharge instructions have been reviewed and given to patient and son. The patient and son verbalized understanding and acceptance of instructions.

## 2018-02-14 NOTE — ANESTHESIA POSTPROCEDURE EVALUATION
Post-Anesthesia Evaluation and Assessment    Patient: Nallely Alegre MRN: [id number]  SSN: [id number]    YOB: 1960  Age: 61 y.o. Sex: female       Cardiovascular Function/Vital Signs  Visit Vitals    /57    Pulse 83    Temp 36.6 °C (97.9 °F)    Resp 13    Ht 5' 5\" (1.651 m)    Wt 53.5 kg (118 lb)    SpO2 100%    Breastfeeding No    BMI 19.64 kg/m2       Patient is status post MAC anesthesia for Procedure(s):  COLONOSCOPY, EGD  ESOPHAGOGASTRODUODENOSCOPY (EGD)  ESOPHAGOGASTRODUODENAL (EGD) BIOPSY. Nausea/Vomiting: None    Postoperative hydration reviewed and adequate. Pain:  Pain Scale 1: Numeric (0 - 10) (02/14/18 0935)  Pain Intensity 1: 4 (02/14/18 0935)   Managed    Neurological Status: At baseline    Mental Status and Level of Consciousness: Arousable    Pulmonary Status:   O2 Device: Room air (02/14/18 0950)   Adequate oxygenation and airway patent    Complications related to anesthesia: None    Post-anesthesia assessment completed.  No concerns    Signed By: Roberto Carlos Moore MD     February 14, 2018

## 2018-02-14 NOTE — DISCHARGE INSTRUCTIONS
[facility] ZEB Montoya MD  [phone]      February 14, 2018    Marybeth Rowe  YOB: 1960    COLONOSCOPY DISCHARGE INSTRUCTIONS    If there is redness at IV site you should apply warm compress to area. If redness or soreness persist contact Dr. Elisha Montoya' or your primary care doctor. There may be a slight amount of blood passed from the rectum. Gaseous discomfort may develop, but walking, belching will help relieve this. You may not operate a vehicle for 12 hours  You may not operate machinery or dangerous appliances for rest of today  You may not drink alcoholic beverages for 12 hours  Avoid making any critical decisions for 24 hours    DIET:  You may resume your normal diet, but some patients find that heavy or large meals may lead to indigestion or vomiting. I suggest a light meal as first food intake. MEDICATIONS:  The use of some over-the-counter pain medication may lead to bleeding after colon biopsies or polyp removal.  Tylenol (also called acetaminophen) is safe to take even if you have had colonoscopy with polyp removal.  Based on the procedure you had today you may not safely take aspirin or aspirin-like products for the next ten (10) days. Remember that Tylenol (also called acetaminophen) is safe to take after colonoscopy even if you have had biopsies or polyps removed. ACTIVITY:  You may resume your normal household activities, but it is recommended that you spend the remainder of the day resting -  avoid any strenuous activity.     CALL DR. Salma Kohler' OFFICE IF:  Increasing pain, nausea, vomiting  Abdominal distension (swelling)  Significant new or increased bleeding (oral or rectal)  Fever/Chills  Chest pain/shortness of breath                       Additional instructions:   No aspirin 10 days  We found that there are small ulcers in the small bowel where it's been connected to the stomach. I have prescribed medication for that to see if that makes your abdominal pain and pressure better. There is no diverticulitis. The colon is healthy. Great news. Next colonoscopy in 10 years  If dizziness continues to be a problem for you I need you to discuss that with your primary care doctor. It was an honor to be your doctor today. Please let me or my office staff know if you have any feedback about today's procedure. Pricila Gore MD    Colonoscopy saves lives, and can prevent colon cancer. Everyone aged 48 or older needs colonoscopy. Tell your family and friends: get the test!    Sucralfate (By mouth)   Sucralfate ([id number])  Treats ulcers. Brand Name(s): Carafate   There may be other brand names for this medicine. When This Medicine Should Not Be Used: This medicine is not right for everyone. Do not use it if you had an allergic reaction to sucralfate. How to Use This Medicine:   Liquid, Tablet  · Your doctor will tell you how much medicine to use. Do not use more than directed. · It is best to take this medicine on an empty stomach. · Do not stop taking the medicine unless your doctor tells you to, even if you feel better. · Tablet: Swallow with a glass of water. Take it 1 hour before meals and at bedtime. · Oral liquid: Measure the oral liquid medicine with a marked measuring spoon, oral syringe, or medicine cup. Shake it well before each use. · Missed dose: Take a dose as soon as you remember. If it is almost time for your next dose, wait until then and take a regular dose. Do not take extra medicine to make up for a missed dose. · Store the medicine in a closed container at room temperature, away from heat, moisture, and direct light. Do not freeze the oral liquid. Drugs and Foods to Avoid:   Ask your doctor or pharmacist before using any other medicine, including over-the-counter medicines, vitamins, and herbal products.   · Some medicines can affect how sucralfate works. Tell your doctor if you are using any of the following:  ¨ Cimetidine, digoxin, levothyroxine, phenytoin, quinidine, ranitidine, theophylline  ¨ Medicine to treat infection (including fluoroquinolones, ketoconazole, tetracycline)  · Take antacids more than one-half hour before or after taking sucralfate oral liquid. · Take cimetidine, ciprofloxacin, digoxin, norfloxacin, ofloxacin, or ranitidine 2 hours before you take sucralfate oral liquid. Warnings While Using This Medicine:   · Tell your doctor if you are pregnant or breastfeeding, or if you have kidney disease or diabetes. · This medicine may cause the following problems:  ¨ Blood clot in the lungs or brain, which could be life-threatening  ¨ High blood sugar  · Your doctor will check your progress and the effects of this medicine at regular visits. Keep all appointments. · Keep all medicine out of the reach of children. Never share your medicine with anyone. Possible Side Effects While Using This Medicine:   Call your doctor right away if you notice any of these side effects:  · Allergic reaction: Itching or hives, swelling in your face or hands, swelling or tingling in your mouth or throat, chest tightness, trouble breathing  · Chest pain, trouble breathing, coughing up blood  · Increased hunger or thirst, change in how much or how often you urinate, unusual weight loss  · Numbness or weakness in your arm or leg, or on one side of your body  · Pain in your lower leg (calf)  · Sudden or severe headache, problems with vision, speech, or walking  If you notice these less serious side effects, talk with your doctor:   · Constipation  · Dizziness  · Dry mouth  · Mild stomach cramps, diarrhea  · Stomach upset, passing gas  If you notice other side effects that you think are caused by this medicine, tell your doctor. Call your doctor for medical advice about side effects.  You may report side effects to FDA at 0-705-EXJ-5722  © 2017 [facility] [address] is for End User's use only and may not be sold, redistributed or otherwise used for commercial purposes. The above information is an  only. It is not intended as medical advice for individual conditions or treatments. Talk to your doctor, nurse or pharmacist before following any medical regimen to see if it is safe and effective for you. Pantoprazole (By mouth)   Pantoprazole (pan-TOE-pra-zole)  Treats gastroesophageal reflux disease (GERD), a damaged esophagus, and high levels of stomach acid. This medicine is a proton pump inhibitor (PPI). Brand Name(s): Protonix   There may be other brand names for this medicine. When This Medicine Should Not Be Used: This medicine is not right for everyone. Do not use it if you had an allergic reaction to pantoprazole or similar medicines. How to Use This Medicine:   Packet, Tablet, Delayed Release Tablet, Long Acting Tablet  · Your doctor will tell you how much medicine to use. Do not use more than directed. Take the medicine at least 30 minutes before a meal.  · Delayed-release tablet: Swallow the tablet whole. Do not crush, break, or chew it. · Delayed-release packet:   ¨ To prepare with applesauce:   § Mix the packet contents with 1 teaspoon of applesauce. Do not mix with water, or other liquids or food. Do not divide the packet contents to make smaller doses. § Swallow the mixture within 10 minutes after you mix it. Do not chew or crush the granules. § Sip some water after you take the mixture to make sure you swallow all of the medicine. ¨ To prepare with apple juice:   § Mix the packet contents with 1 teaspoon of apple juice in a small cup. Do not divide the packet contents to make smaller doses. § Stir for 5 seconds and drink the mixture immediately. Do not chew or crush the granules. § To make sure you get all of the medicine, add more apple juice to the cup. Drink it immediately.   ¨ To prepare for a feeding tube:   § Pour the packet contents in a 2-ounce (60 milliliter [mL]) catheter-tip syringe. § Add 10 mL of apple juice to the syringe. Add the mixture to the tube. Gently tap or shake the barrel of the syringe to help empty it. § Add 10 mL of apple juice to the syringe and put it in the tube. Do this at least 2 times. There should be no granules left in the syringe. · This medicine should come with a Medication Guide. Ask your pharmacist for a copy if you do not have one. · Missed dose: Take a dose as soon as you remember. If it is almost time for your next dose, wait until then and take a regular dose. Do not take extra medicine to make up for a missed dose. · Store the medicine in a closed container at room temperature, away from heat, moisture, and direct light. Drugs and Foods to Avoid:   Ask your doctor or pharmacist before using any other medicine, including over-the-counter medicines, vitamins, and herbal products. · Some foods and medicines can affect how pantoprazole works. Tell your doctor if you are using any of the following:   ¨ Ampicillin, atazanavir, digoxin, erlotinib, ketoconazole, methotrexate, mycophenolate mofetil, nelfinavir  ¨ Blood thinner (including warfarin)  ¨ Diuretic (water pill)  ¨ Iron supplements  Warnings While Using This Medicine:   · Tell your doctor if you are pregnant or breastfeeding, or if you have liver disease, lupus, or osteoporosis. · This medicine may cause the following problems:  ¨ Kidney problems  ¨ Low vitamin B12 or magnesium levels  ¨ Increased risk of broken bones in the hip, wrist, or spine  · This medicine can cause diarrhea. Call your doctor if the diarrhea becomes severe, does not stop, or is bloody. Do not take any medicine to stop diarrhea until you have talked to your doctor. Diarrhea can occur 2 months or more after you stop taking this medicine. · Tell any doctor or dentist who treats you that you are using this medicine.  This medicine may affect certain medical test results. · Your doctor will check your progress and the effects of this medicine at regular visits. Keep all appointments. · Keep all medicine out of the reach of children. Never share your medicine with anyone. Possible Side Effects While Using This Medicine:   Call your doctor right away if you notice any of these side effects:  · Allergic reaction: Itching or hives, swelling in your face or hands, swelling or tingling in your mouth or throat, chest tightness, trouble breathing  · Blistering, peeling, red skin rash  · Fever, joint pain, swelling in your body, unusual weight gain, change in how much or how often you urinate  · Joint pain, rash on your cheeks or arms that gets worse in the sun  · Seizures, dizziness, uneven heartbeat, muscle cramps or twitching  · Severe diarrhea, stomach cramps, fever  · Stomach pain, nausea, vomiting, weight loss  If you notice other side effects that you think are caused by this medicine, tell your doctor. Call your doctor for medical advice about side effects. You may report side effects to FDA at 7-470-FDA-1907  © 2017 [facility] Information is for End User's use only and may not be sold, redistributed or otherwise used for commercial purposes. The above information is an  only. It is not intended as medical advice for individual conditions or treatments. Talk to your doctor, nurse or pharmacist before following any medical regimen to see if it is safe and effective for you. Linaclotide (By mouth)   Linaclotide (adv-WC-fyp-tide)  Treats irritable bowel syndrome with constipation and chronic idiopathic constipation. Brand Name(s): Linzess   There may be other brand names for this medicine. When This Medicine Should Not Be Used: This medicine is not right for everyone. Do not use it if you had an allergic reaction to linaclotide, or if you have a bowel or stomach blockage.   How to Use This Medicine: Capsule  · Your doctor will tell you how much medicine to use. Do not use more than directed. · Take this medicine on an empty stomach, at least 30 minutes before breakfast or your first meal of the day. · Swallow the capsule whole. Do not crush, break, or chew it. · If you have trouble swallowing the capsule, you may mix the contents with applesauce or water. ¨ To mix with applesauce: Open the capsule and sprinkle the beads on 1 teaspoonful of applesauce. Swallow the mixture immediately without chewing. Do not store it for later use. ¨ To mix with water: Open the capsule and sprinkle the beads into a cup with 30 mL (1 ounce) of water. Gently swirl for at least 10 seconds. Swallow the entire mixture immediately. Add another 30 mL (1 ounce) of water to the cup and swirl. Drink the water right away to make sure all of the medicine is taken. Do not store it for later use. ¨ The water mixture may also be used with a nasogastric or gastric feeding tube. After the mixture is given, flush the tube with an additional 10 mL (2 teaspoons) of water. · This medicine should come with a Medication Guide. Ask your pharmacist for a copy if you do not have one. · Missed dose: Skip the missed dose and go back to your regular dosing schedule. Do not double doses. · Store the medicine in a closed container at room temperature, away from heat, moisture, and direct light. Keep the medicine in the original bottle until you are ready to use it. The bottle contains a desiccant packet that helps protect the capsules from moisture. Do not remove the packet from the bottle. Drugs and Foods to Avoid:      Ask your doctor or pharmacist before using any other medicine, including over-the-counter medicines, vitamins, and herbal products. Warnings While Using This Medicine:   · Tell your doctor if you are pregnant or breastfeeding, or if you have other stomach or bowel problems.   · Your doctor will check your progress and the effects of this medicine at regular visits. Keep all appointments. · Keep all medicine out of the reach of children. Never share your medicine with anyone. Possible Side Effects While Using This Medicine:   Call your doctor right away if you notice any of these side effects:  · Allergic reaction: Itching or hives, swelling in your face or hands, swelling or tingling in your mouth or throat, chest tightness, trouble breathing  · Bloody or black, tarry stools  · Lightheadedness, dizziness, fainting  · Severe diarrhea or stomach pain  If you notice these less serious side effects, talk with your doctor:   · Mild diarrhea  If you notice other side effects that you think are caused by this medicine, tell your doctor. Call your doctor for medical advice about side effects. You may report side effects to FDA at 4-676-FDA-9398  © 2017 [address] Information is for End User's use only and may not be sold, redistributed or otherwise used for commercial purposes. The above information is an  only. It is not intended as medical advice for individual conditions or treatments. Talk to your doctor, nurse or pharmacist before following any medical regimen to see if it is safe and effective for you.

## 2018-02-14 NOTE — H&P
Date: 2018 8:30 AM   Patient Name: Aman Bonds   Account #: [id number]    Gender: Female    (age): 1960 (61)       Provider:     Cullen Barton. Vince Belle MD        Referring Physician:     Bob Hidalgo MD  [address] [place] [address].[address]  [phone] (phone)  [phone] (fax)        Chief Complaint: Abdominal pain           History of Present Illness:   Sudden onset pain in December  Seen by surgeon who hospitalized her for CT positive diverticulitis at [facility] - Dr. Ryan Ledezma  Discharged from hospital and continues to complain of pain, tightness on eating, losing weight. Multiple ER visits multiple CT scans, seen by colorectal surgeon, repeat CT scan  Only finding is ongoing diverticulosis, and a fat density mass in the left abdomen that the radiologist feels is fat necrosis. THis was present on scans ordered and evaluated by Dr. Ryan Ledezma and felt not to be the cause of her symptoms. Surgical intervention was deemed not indicated. She was advised to see me for ongoing symptoms. ? Sudden onset pain in December  Seen by surgeon who hospitalized her for CT positive diverticulitis at [facility] - Dr. Ryan Ledezma  Discharged from hospital and continues to complain of pain, tightness on eating, losing weight. Multiple ER visits multiple CT scans, seen by colorectal surgeon, repeat CT scan  Only finding is ongoing diverticulosis, and a fat density mass in the left abdomen that the radiologist feels is fat necrosis. THis was present on scans ordered and evaluated by Dr. Ryan Ledezma and felt not to be the cause of her symptoms. Surgical intervention was deemed not indicated. She was advised to see me for ongoing symptoms.  ?       Past Medical History      Medical Conditions: High cholesterol   Surgical Procedures: Gastric By-Pass, 2007  Gallbladder surgery, Can't remember  Other major surgeries:, Bowel obstruction, intessuption of the bowel, estella   Dx Studies: CAT Scan, 12/23, 12/29, 1/8   Medications: gabapentin 300 mg TAKE 1 TAB IN THE MORNING AND 2 TABS AT NIGHT  simvastatin 20 mg TAKE 1 TABLET BY MOUTH EVERY EVENING  topiramate 100 mg TAKE 1 TABLET BY MOUTH TWICE A DAY  ziprasidone HCl 80 mg TAKE 1 CAPSULE BY MOUTH AT BEDTIME WITH MEALS   Allergies: Latex  morphine   Immunizations: Flu vaccine, 11/2017      Social History      Alcohol: None   Tobacco: Never smoker   Drugs: None   Exercise: None   Caffeine: Daily. Marital Status:          Occupation:               Family History No Knowledge Of Family History       Review of Systems:   Cardiovascular: Denies chest pain, irregular heart beat, palpitations, peripheral edema, syncope, Sweats. Constitutional: Presents suffers from fatigue, loss of appetite, weight loss. Denies fever, weight gain. ENMT: Denies nose bleeds, sore throat, hearing loss. Endocrine: Presents suffers from excessive thirst. Denies heat intolerance. Eyes: Denies loss of vision. Gastrointestinal: Presents suffers from abdominal pain, abdominal swelling, constipation, Bloating/gas. Denies change in bowel habits, diarrhea, heartburn, jaundice, nausea, rectal bleeding, stomach cramps, vomiting, dysphagia, rectal pain, Stool incontinence. Genitourinary: Denies dark urine, dysuria, frequent urination, hematuria, incontinence. Hematologic/Lymphatic: Presents suffers from easy bruising. Denies prolonged bleeding. Integumentary: Denies itching, rashes, sun sensitivity. Musculoskeletal: Presents suffers from back pain, muscle weakness. Denies arthritis, gout, joint pain, stiffness. Neurological: Presents suffers from dizziness. Denies fainting, frequent headaches, memory loss. Psychiatric: Presents suffers from anxiety, depression, difficulty sleeping. Denies hallucinations, nervousness, panic attacks, paranoia. Respiratory: Denies cough, dyspnea, wheezing.       Vital Signs:   BP  (mmHg)  Pulse  (ppm) Rhythm Weight (lbs/oz) Height (ft/in) BMI Resp/min Temp   105/77 97 Regular 118 /  5 / 3 20.9 12 98.7 (F)      Physical Exam:   Constitutional:      Appearance: No distress, appears comfortable. Communication: Understands/receives spoken information. Skin:      Inspection: Healing vesicular rash left suprapubic/periumbilical region. Palpation: No subcutaneous nodules. Head/face: Inspection: Normacephalic, atraumatic. Palpation: normal.   Eyes:      Conjunctivae/lids: Normal.   Pupils/irises: Pupils equal, round and normal.   ENMT:      External: Normal.   Hearing: Normal.   Neck:      Neck: Normal appearance, trachea midline. Jugular veins: No JVD noted. Respiratory:      Effort: Normal respiratory effort, comfortable, speaks in complete sentences. Gastrointestinal/Abdomen:      Abdomen: non-distended, moderate tenderness in LLQ no palpable mass no rebound + voluntary guarding. Liver/Spleen: normal, normal size, Liver size and consistency normal, spleen is non-palpable. Musculoskeletal:      Gait/station: normal.   Digits/nails: Normal, no spooning of nails, clubbing, or splinter hemorrhages, no clubbing, cyanosis, petechiae or other inflammatory conditions. Psychiatric:      Judgment/insight: Normal, normal judgement, normal insight. Orientation: oriented to time, space and person. Lymphatic:      Neck: No lymphadenopathy in the cervical or supraclavicular chain. Other: No periumbilic lymphadenopathy. Lab Results: No Electronic Results      Impressions: Left sided abdominal pain? ? Left sided abdominal pain? Assessment: The vesicular rash -?shingles. This was attributed previously to a drug reaction (fluoroquinolone) and is isolated in a focal area as opposed to linearly following a dermatome.   I have no experience with fat necrosis, this is usually a surgical issue and as discussed above I must conclude her surgeons (both Dr. Riya Bates and the colorectal surgeon she met with) have concluded this does not require surgical attention. There is no medical or endoscopic options for treatment of this condition to my knowledge. The history of diverticulitis and pain and tenderness all suggest to me ongoing diverticulitis. The only thing I can think to do to assist at this point is another course of antibiotics. Even though the CT done earlier this week was negative, today's exam suggests we ought to proceed with this plan. I have advised that if pain becomes worse despite antibiotics, if peritoneal signs develop, if she has ongoing weight loss, bleeding, vomiting or other alarm symptoms that she present back to [facility] doctor's ER for re-evaluation. ? The vesicular rash - ?shingles. This was attributed previously to a drug reaction (fluoroquinolone) and is isolated in a focal area as opposed to linearly following a dermatome. I have no experience with fat necrosis, this is usually a surgical issue and as discussed above I must conclude her surgeons (both Dr. Collin Smith and the colorectal surgeon she met with) have concluded this does not require surgical attention. There is no medical or endoscopic options for treatment of this condition to my knowledge. The history of diverticulitis and pain and tenderness all suggest to me ongoing diverticulitis. The only thing I can think to do to assist at this point is another course of antibiotics. Even though the CT done earlier this week was negative, today's exam suggests we ought to proceed with this plan. I have advised that if pain becomes worse despite antibiotics, if peritoneal signs develop, if she has ongoing weight loss, bleeding, vomiting or other alarm symptoms that she present back to [facility] doctor's ER for re-evaluation. Plan: amoxicillin-pot clavulanate 875-125 mg 1 PO BID x14 days  Follow-up office visit in 1 month? ?amoxicillin-pot clavulanate? ?875-125 mg? ?1 PO BID x14 days? ? ?  ? ? ? Follow-up office visit in 1 month? ?          Risk & Medical Necessity: The patient requires Moderate Severity care for this visit. Diagnosis and management options are Multiple. The amount of data reviewed and/or ordered is Minimal/None. The level of risk is Moderate. Notes:              Hilaria Cole. Evonnie Denver, MD     Electronically signed on 1/12/2018 9:20:16 AM by Hilaria Cole.  Evonnie Denver, MD

## 2018-02-14 NOTE — ANESTHESIA PREPROCEDURE EVALUATION
Anesthetic History     Increased risk of difficult airway, PONV and history of awareness of surgery under anesthesia          Review of Systems / Medical History  Patient summary reviewed and pertinent labs reviewed    Pulmonary            Asthma        Neuro/Psych         Psychiatric history    Comments: Bipolar Cardiovascular    Hypertension              Exercise tolerance: >4 METS     GI/Hepatic/Renal     GERD: well controlled           Endo/Other        Arthritis     Other Findings              Physical Exam    Airway  Mallampati: II  TM Distance: 4 - 6 cm  Neck ROM: normal range of motion   Mouth opening: Normal     Cardiovascular  Regular rate and rhythm,  S1 and S2 normal,  no murmur, click, rub, or gallop  Rhythm: regular  Rate: normal         Dental  No notable dental hx       Pulmonary  Breath sounds clear to auscultation               Abdominal  GI exam deferred       Other Findings            Anesthetic Plan    ASA: 3  Anesthesia type: MAC          Induction: Intravenous  Anesthetic plan and risks discussed with: Patient

## 2018-02-14 NOTE — INTERVAL H&P NOTE
Pre-Endoscopy H&P Update  Chief complaint/HPI/ROS:  The indication for the procedure, the patient's history and the patient's current medications are reviewed prior to the procedure and that data is reported on the H&P to which this document is attached. Any significant complaints with regard to organ systems will be noted, and if not mentioned then a review of systems is not contributory. Past Medical History:   Diagnosis Date    Abdominal pain 5/3/2013    Anxiety     Arthritis     Asthma     Depression     Hypercholesterolemia     Hypertension     PRIOR TO GASTRIC BYPASS    Joint pain     Kidney stones     Murmur, heart     Muscle ache     and pain    Nausea & vomiting     Other ill-defined conditions(799.89) 2013    \"BAD BRONCHITIS & PNEUMONIA\"    Other ill-defined conditions(799.89)     MITRAL VALVE PROLAPSE. DX. WHEN LITTLE.  Psychiatric disorder     DEPRESSION. BIPOLAR.  Status following gastric bypass for weight loss 5/3/2013    In  in [place], [place] with Dr. Adebayo Castro.  Trouble in sleeping     Unspecified adverse effect of anesthesia     \"WOKE  [address] SO TERRIFIED\"    Unspecified adverse effect of anesthesia     LAST COUPLE SURGERIES, FIBEROPTIC USED. THROAT HAS CURVE.  Unspecified adverse effect of anesthesia     states need fibro optic for intubation due to 'curature in throat'      Past Surgical History:   Procedure Laterality Date    HX  SECTION       x 2    HX CHOLECYSTECTOMY      HX GASTRIC BYPASS  mid     in [place] HX GI  13    lap SBR w/ reconstruction of Jej., resection of peritoneal mass by Dr Dawn Bronson [facility] HX GI      bowel obstruction    mid     HX HERNIA REPAIR      X 3 HERNIAS REPAIRED. UNSURE WHICH KIND.     HX ORTHOPAEDIC      RIGHT KNEE SURGERY AT AGE 11 YRS.    HX ORTHOPAEDIC      LT ANKLE FX. REPAIR. 6 SCREWS    HX OTHER SURGICAL      \"strangled bowel\"    HX OTHER SURGICAL ABDOMINOPLASTY WITH INTERCEPTED HERNIAS X 4-5 DONE WITH MESH PLACEMENT    NEUROLOGICAL PROCEDURE UNLISTED  2011    CERVICAL FUSION. \"UNABLE TO MOVE HEAD SIDE TO SIDE, UP OR BACK\" PER PT. Social   Social History   Substance Use Topics    Smoking status: Never Smoker    Smokeless tobacco: Never Used    Alcohol use No      Family History   Problem Relation Age of Onset    Cancer Mother      breast    Post-op Nausea/Vomiting Mother     Depression Mother     Cancer Father      prostate    Heart Disease Brother      MI    Anesth Problems Neg Hx       Allergies   Allergen Reactions    Latex Itching and Other (comments)     Oozing sores, rash    Erythromycin Itching     WITH RASH & GI ISSUES.  Morphine Itching     Tolerates hydromorphone, hydrocodone and oxycodone    Pcn [Penicillins] Rash and Itching    Tape [Adhesive] Rash and Other (comments)     Oozing sores    Tetracycline Itching      Prior to Admission Medications   Prescriptions Last Dose Informant Patient Reported? Taking? albuterol (PROVENTIL HFA, VENTOLIN HFA, PROAIR HFA) 90 mcg/actuation inhaler   Yes No   Sig: Take 1 Puff by inhalation. AS NEEDED   lamoTRIgine (LAMICTAL) 200 mg tablet 2/12/2018 at pm  Yes No   Sig: Take 200 mg by mouth nightly. oxyCODONE-acetaminophen (PERCOCET) 5-325 mg per tablet 2/12/2018 at pm  No No   Sig: Take 1 Tab by mouth every four (4) hours as needed for Pain. Max Daily Amount: 6 Tabs. polyethylene glycol (MIRALAX) 17 gram/dose powder 2/7/2018 at am  No Yes   Sig: Take 17 g by mouth daily. 1 tablespoon with 8 oz of water daily   simvastatin (ZOCOR) 10 mg tablet 2/12/2018 at pm  Yes No   Sig: Take  by mouth nightly. ziprasidone (GEODON) 80 mg capsule 2/12/2018 at pm  Yes Yes   Sig: Take 80 mg by mouth nightly. Facility-Administered Medications: None       PHYSICAL EXAM:  The patient is examined immediately prior to the procedure.   Visit Vitals    /60    Pulse 89    Temp 98.7 °F (37.1 °C)    Resp 13    Ht 5' 5\" (1.651 m)    Wt 53.5 kg (118 lb)    SpO2 100%    Breastfeeding No    BMI 19.64 kg/m2     Gen: Appears comfortable, no distress. Pulm: comfortable respirations with no abnormal audible breath sounds  CV: heart regular, well perfused  GI: abdomen flat. PLAN:  Informed consent discussion held, patient afforded an opportunity to ask questions and all questions answered. After being advised of the risks, benefits, and alternatives, the patient requested that we proceed and indicated so on a written consent form. Will proceed with procedure as planned. Siobhan Pedraza MD    Now she reports her pain has resolved but wants evaluation for dizziness. If EGD colonoscopy normal will ask her to return to primary care for neurologic evaluation.

## 2018-02-14 NOTE — IP AVS SNAPSHOT
[address] 
[phone] Patient: Ang Weiner MRN: [id number] BLX:[id number] About your hospitalization You were admitted on:  February 14, 2018 You last received care in the:  [facility] You were discharged on:  February 14, 2018 Why you were hospitalized Your primary diagnosis was:  Not on File Follow-up Information None Your Scheduled Appointments Tuesday February 27, 2018  8:45 AM EST New Patient with Dennie Rigg, MD [address] [place] [address]) [facility] [employer] [facility] 149  
[phone] Discharge Orders None A check shadi indicates which time of day the medication should be taken. My Medications START taking these medications Instructions Each Dose to Equal  
 Morning Noon Evening Bedtime  
 linaclotide 145 mcg Cap capsule Commonly known as:  Alicia Anoop Your last dose was: Your next dose is: Take 1 Cap by mouth Daily (before breakfast). 145 mcg * pantoprazole 20 mg tablet Commonly known as:  PROTONIX Your last dose was: Your next dose is: Take 1 Tab by mouth Daily (before breakfast). Indications: Duodenal Ulcer 20 mg  
    
   
   
   
  
 * pantoprazole 20 mg tablet Commonly known as:  PROTONIX Your last dose was: Your next dose is: Take 1 Tab by mouth Daily (before breakfast) for 60 days. Indications: Duodenal Ulcer 20 mg  
    
   
   
   
  
 sucralfate 1 gram tablet Commonly known as:  [place] Your last dose was: Your next dose is: Take 1 Tab by mouth four (4) times daily for 14 days. Indications: Duodenal Ulcer 1 g * Notice:   This list has 2 medication(s) that are the same as other medications prescribed for you. Read the directions carefully, and ask your doctor or other care provider to review them with you. CONTINUE taking these medications Instructions Each Dose to Equal  
 Morning Noon Evening Bedtime  
 albuterol 90 mcg/actuation inhaler Commonly known as:  PROVENTIL HFA, VENTOLIN HFA, PROAIR HFA Your last dose was: Your next dose is: Take 1 Puff by inhalation. AS NEEDED  
 1 Puff GEODON 80 mg capsule Generic drug:  ziprasidone Your last dose was: Your next dose is: Take 80 mg by mouth nightly. 80 mg LaMICtal 200 mg tablet Generic drug:  lamoTRIgine Your last dose was: Your next dose is: Take 200 mg by mouth nightly. 200 mg  
    
   
   
   
  
 oxyCODONE-acetaminophen 5-325 mg per tablet Commonly known as:  PERCOCET Your last dose was: Your next dose is: Take 1 Tab by mouth every four (4) hours as needed for Pain. Max Daily Amount: 6 Tabs. 1 Tab  
    
   
   
   
  
 polyethylene glycol 17 gram/dose powder Commonly known as:  Debbie Carlo Your last dose was: Your next dose is: Take 17 g by mouth daily. 1 tablespoon with 8 oz of water daily 17 g  
    
   
   
   
  
 simvastatin 10 mg tablet Commonly known as:  ZOCOR Your last dose was: Your next dose is: Take  by mouth nightly. Where to Get Your Medications These medications were sent to [address] [place] [address], [address] [place], [place] [address]y 1  [address] [address] Phone:  [phone]  
  pantoprazole 20 mg tablet Information on where to get these meds will be given to you by the nurse or doctor. ! Ask your nurse or doctor about these medications  
  linaclotide 145 mcg Cap capsule pantoprazole 20 mg tablet  
 sucralfate 1 gram tablet Discharge Instructions [address]ony. Sylvia Page, [address] [facility] 
[phone] February 14, 2018 Lynn Jimenez YOB: 1960 COLONOSCOPY DISCHARGE INSTRUCTIONS If there is redness at IV site you should apply warm compress to area. If redness or soreness persist contact Dr. Sylvia Page' or your primary care doctor. There may be a slight amount of blood passed from the rectum. Gaseous discomfort may develop, but walking, belching will help relieve this. You may not operate a vehicle for 12 hours You may not operate machinery or dangerous appliances for rest of today You may not drink alcoholic beverages for 12 hours Avoid making any critical decisions for 24 hours DIET: 
You may resume your normal diet, but some patients find that heavy or large meals may lead to indigestion or vomiting. I suggest a light meal as first food intake. MEDICATIONS: 
The use of some over-the-counter pain medication may lead to bleeding after colon biopsies or polyp removal.  Tylenol (also called acetaminophen) is safe to take even if you have had colonoscopy with polyp removal.  Based on the procedure you had today you may not safely take aspirin or aspirin-like products for the next ten (10) days. Remember that Tylenol (also called acetaminophen) is safe to take after colonoscopy even if you have had biopsies or polyps removed. ACTIVITY: 
You may resume your normal household activities, but it is recommended that you spend the remainder of the day resting -  avoid any strenuous activity. CALL DR. Salvatore Wagner' OFFICE IF: Increasing pain, nausea, vomiting Abdominal distension (swelling) Significant new or increased bleeding (oral or rectal) Fever/Chills Chest pain/shortness of breath Additional instructions: No aspirin 10 days We found that there are small ulcers in the small bowel where it's been connected to the stomach. I have prescribed medication for that to see if that makes your abdominal pain and pressure better. There is no diverticulitis. The colon is healthy. Great news. Next colonoscopy in 10 years If dizziness continues to be a problem for you I need you to discuss that with your primary care doctor. It was an honor to be your doctor today. Please let me or my office staff know if you have any feedback about today's procedure. Courtney Esquivel MD 
 
Colonoscopy saves lives, and can prevent colon cancer. Everyone aged 48 or older needs colonoscopy. Tell your family and friends: get the test! 
 
Sucralfate (By mouth) Sucralfate ([id number]) Treats ulcers. Brand Name(s): Carafate There may be other brand names for this medicine. When This Medicine Should Not Be Used: This medicine is not right for everyone. Do not use it if you had an allergic reaction to sucralfate. How to Use This Medicine:  
Liquid, Tablet · Your doctor will tell you how much medicine to use. Do not use more than directed. · It is best to take this medicine on an empty stomach. · Do not stop taking the medicine unless your doctor tells you to, even if you feel better. · Tablet: Swallow with a glass of water. Take it 1 hour before meals and at bedtime. · Oral liquid: Measure the oral liquid medicine with a marked measuring spoon, oral syringe, or medicine cup. Shake it well before each use. · Missed dose: Take a dose as soon as you remember. If it is almost time for your next dose, wait until then and take a regular dose. Do not take extra medicine to make up for a missed dose. · Store the medicine in a closed container at room temperature, away from heat, moisture, and direct light. Do not freeze the oral liquid. Drugs and Foods to Avoid: Ask your doctor or pharmacist before using any other medicine, including over-the-counter medicines, vitamins, and herbal products. · Some medicines can affect how sucralfate works. Tell your doctor if you are using any of the following: ¨ Cimetidine, digoxin, levothyroxine, phenytoin, quinidine, ranitidine, theophylline ¨ Medicine to treat infection (including fluoroquinolones, ketoconazole, tetracycline) · Take antacids more than one-half hour before or after taking sucralfate oral liquid. · Take cimetidine, ciprofloxacin, digoxin, norfloxacin, ofloxacin, or ranitidine 2 hours before you take sucralfate oral liquid. Warnings While Using This Medicine: · Tell your doctor if you are pregnant or breastfeeding, or if you have kidney disease or diabetes. · This medicine may cause the following problems: ¨ Blood clot in the lungs or brain, which could be life-threatening ¨ High blood sugar · Your doctor will check your progress and the effects of this medicine at regular visits. Keep all appointments. · Keep all medicine out of the reach of children. Never share your medicine with anyone. Possible Side Effects While Using This Medicine:  
Call your doctor right away if you notice any of these side effects: · Allergic reaction: Itching or hives, swelling in your face or hands, swelling or tingling in your mouth or throat, chest tightness, trouble breathing · Chest pain, trouble breathing, coughing up blood · Increased hunger or thirst, change in how much or how often you urinate, unusual weight loss · Numbness or weakness in your arm or leg, or on one side of your body · Pain in your lower leg (calf) · Sudden or severe headache, problems with vision, speech, or walking If you notice these less serious side effects, talk with your doctor: · Constipation · Dizziness · Dry mouth · Mild stomach cramps, diarrhea · Stomach upset, passing gas If you notice other side effects that you think are caused by this medicine, tell your doctor. Call your doctor for medical advice about side effects. You may report side effects to FDA at 5-930-BXV-2002 © 2017 [address] Information is for End User's use only and may not be sold, redistributed or otherwise used for commercial purposes. The above information is an  only. It is not intended as medical advice for individual conditions or treatments. Talk to your doctor, nurse or pharmacist before following any medical regimen to see if it is safe and effective for you. Pantoprazole (By mouth) Pantoprazole (pan-TOE-pra-zole) Treats gastroesophageal reflux disease (GERD), a damaged esophagus, and high levels of stomach acid. This medicine is a proton pump inhibitor (PPI). Brand Name(s): Protonix There may be other brand names for this medicine. When This Medicine Should Not Be Used: This medicine is not right for everyone. Do not use it if you had an allergic reaction to pantoprazole or similar medicines. How to Use This Medicine:  
Packet, Tablet, Delayed Release Tablet, Long Acting Tablet · Your doctor will tell you how much medicine to use. Do not use more than directed. Take the medicine at least 30 minutes before a meal. 
· Delayed-release tablet: Swallow the tablet whole. Do not crush, break, or chew it. · Delayed-release packet: ¨ To prepare with applesauce: § Mix the packet contents with 1 teaspoon of applesauce. Do not mix with water, or other liquids or food. Do not divide the packet contents to make smaller doses. § Swallow the mixture within 10 minutes after you mix it. Do not chew or crush the granules. § Sip some water after you take the mixture to make sure you swallow all of the medicine. ¨ To prepare with apple juice: § Mix the packet contents with 1 teaspoon of apple juice in a small cup. Do not divide the packet contents to make smaller doses. § Stir for 5 seconds and drink the mixture immediately. Do not chew or crush the granules. § To make sure you get all of the medicine, add more apple juice to the cup. Drink it immediately. ¨ To prepare for a feeding tube: § Pour the packet contents in a 2-ounce (60 milliliter [mL]) catheter-tip syringe. § Add 10 mL of apple juice to the syringe. Add the mixture to the tube. Gently tap or shake the barrel of the syringe to help empty it. § Add 10 mL of apple juice to the syringe and put it in the tube. Do this at least 2 times. There should be no granules left in the syringe. · This medicine should come with a Medication Guide. Ask your pharmacist for a copy if you do not have one. · Missed dose: Take a dose as soon as you remember. If it is almost time for your next dose, wait until then and take a regular dose. Do not take extra medicine to make up for a missed dose. · Store the medicine in a closed container at room temperature, away from heat, moisture, and direct light. Drugs and Foods to Avoid: Ask your doctor or pharmacist before using any other medicine, including over-the-counter medicines, vitamins, and herbal products. · Some foods and medicines can affect how pantoprazole works. Tell your doctor if you are using any of the following: ¨ Ampicillin, atazanavir, digoxin, erlotinib, ketoconazole, methotrexate, mycophenolate mofetil, nelfinavir ¨ Blood thinner (including warfarin) ¨ Diuretic (water pill) ¨ Iron supplements Warnings While Using This Medicine: · Tell your doctor if you are pregnant or breastfeeding, or if you have liver disease, lupus, or osteoporosis. · This medicine may cause the following problems: ¨ Kidney problems ¨ Low vitamin B12 or magnesium levels ¨ Increased risk of broken bones in the hip, wrist, or spine · This medicine can cause diarrhea.  Call your doctor if the diarrhea becomes severe, does not stop, or is bloody. Do not take any medicine to stop diarrhea until you have talked to your doctor. Diarrhea can occur 2 months or more after you stop taking this medicine. · Tell any doctor or dentist who treats you that you are using this medicine. This medicine may affect certain medical test results. · Your doctor will check your progress and the effects of this medicine at regular visits. Keep all appointments. · Keep all medicine out of the reach of children. Never share your medicine with anyone. Possible Side Effects While Using This Medicine:  
Call your doctor right away if you notice any of these side effects: · Allergic reaction: Itching or hives, swelling in your face or hands, swelling or tingling in your mouth or throat, chest tightness, trouble breathing · Blistering, peeling, red skin rash · Fever, joint pain, swelling in your body, unusual weight gain, change in how much or how often you urinate · Joint pain, rash on your cheeks or arms that gets worse in the sun · Seizures, dizziness, uneven heartbeat, muscle cramps or twitching · Severe diarrhea, stomach cramps, fever · Stomach pain, nausea, vomiting, weight loss If you notice other side effects that you think are caused by this medicine, tell your doctor. Call your doctor for medical advice about side effects. You may report side effects to FDA at 7-964-FDA-6460 © 2017 [address] Information is for End User's use only and may not be sold, redistributed or otherwise used for commercial purposes. The above information is an  only. It is not intended as medical advice for individual conditions or treatments. Talk to your doctor, nurse or pharmacist before following any medical regimen to see if it is safe and effective for you. Linaclotide (By mouth) Linaclotide (ajq-ND-ura-tide) Treats irritable bowel syndrome with constipation and chronic idiopathic constipation. Brand Name(s): Linzess There may be other brand names for this medicine. When This Medicine Should Not Be Used: This medicine is not right for everyone. Do not use it if you had an allergic reaction to linaclotide, or if you have a bowel or stomach blockage. How to Use This Medicine:  
Capsule · Your doctor will tell you how much medicine to use. Do not use more than directed. · Take this medicine on an empty stomach, at least 30 minutes before breakfast or your first meal of the day. · Swallow the capsule whole. Do not crush, break, or chew it. · If you have trouble swallowing the capsule, you may mix the contents with applesauce or water. ¨ To mix with applesauce: Open the capsule and sprinkle the beads on 1 teaspoonful of applesauce. Swallow the mixture immediately without chewing. Do not store it for later use. ¨ To mix with water: Open the capsule and sprinkle the beads into a cup with 30 mL (1 ounce) of water. Gently swirl for at least 10 seconds. Swallow the entire mixture immediately. Add another 30 mL (1 ounce) of water to the cup and swirl. Drink the water right away to make sure all of the medicine is taken. Do not store it for later use. ¨ The water mixture may also be used with a nasogastric or gastric feeding tube. After the mixture is given, flush the tube with an additional 10 mL (2 teaspoons) of water. · This medicine should come with a Medication Guide. Ask your pharmacist for a copy if you do not have one. · Missed dose: Skip the missed dose and go back to your regular dosing schedule. Do not double doses. · Store the medicine in a closed container at room temperature, away from heat, moisture, and direct light. Keep the medicine in the original bottle until you are ready to use it. The bottle contains a desiccant packet that helps protect the capsules from moisture. Do not remove the packet from the bottle. Drugs and Foods to Avoid: Ask your doctor or pharmacist before using any other medicine, including over-the-counter medicines, vitamins, and herbal products. Warnings While Using This Medicine: · Tell your doctor if you are pregnant or breastfeeding, or if you have other stomach or bowel problems. · Your doctor will check your progress and the effects of this medicine at regular visits. Keep all appointments. · Keep all medicine out of the reach of children. Never share your medicine with anyone. Possible Side Effects While Using This Medicine:  
Call your doctor right away if you notice any of these side effects: · Allergic reaction: Itching or hives, swelling in your face or hands, swelling or tingling in your mouth or throat, chest tightness, trouble breathing · Bloody or black, tarry stools · Lightheadedness, dizziness, fainting · Severe diarrhea or stomach pain If you notice these less serious side effects, talk with your doctor: · Mild diarrhea If you notice other side effects that you think are caused by this medicine, tell your doctor. Call your doctor for medical advice about side effects. You may report side effects to FDA at 9-686-FDA-0659 © 2017 [address] Information is for End User's use only and may not be sold, redistributed or otherwise used for commercial purposes. The above information is an  only. It is not intended as medical advice for individual conditions or treatments. Talk to your doctor, nurse or pharmacist before following any medical regimen to see if it is safe and effective for you. Introducing [facility]! Viji Kearns introduces [employer] patient portal. Now you can access parts of your medical record, email your doctor's office, and request medication refills online. 1. In your internet browser, go to [email or web address] 2. Click on the First Time User? Click Here link in the Sign In box.  You will see the New Member Sign Up page. 3. Enter your Arrively Access Code exactly as it appears below. You will not need to use this code after youve completed the sign-up process. If you do not sign up before the expiration date, you must request a new code. · Ar[employer] Access Code: [email or web address] Expires: 3/4/2018  8:44 AM 
 
4. Enter the last four digits of your Social Security Number (xxxx) and Date of Birth (mm/dd/yyyy) as indicated and click Submit. You will be taken to the next sign-up page. 5. Create a F[employer] Writert ID. This will be your Arrively login ID and cannot be changed, so think of one that is secure and easy to remember. 6. Create a [employer] Writ[employer] password. You can change your password at any time. 7. Enter your Password Reset Question and Answer. This can be used at a later time if you forget your password. 8. Enter your e-mail address. You will receive e-mail notification when new information is available in [address] 9. Click Sign Up. You can now view and download portions of your medical record. 10. Click the Download Summary menu link to download a portable copy of your medical information. If you have questions, please visit the Frequently Asked Questions section of the Arrively website. Remember, [employer] is NOT to be used for urgent needs. For medical emergencies, dial 911. Now available from your iPhone and Android! Providers Seen During Your Hospitalization Provider Specialty Primary office phone Misael Zuniga MD Gastroenterology [phone] Your Primary Care Physician (PCP) Primary Care Physician Office Phone Office Fax [place], [address] [facility] [phone] You are allergic to the following Allergen Reactions Latex Itching Other (comments) Oozing sores, rash Erythromycin Itching WITH RASH & GI ISSUES. Morphine Itching Tolerates hydromorphone, hydrocodone and oxycodone Pcn (Penicillins) Rash Itching Tape (Adhesive) Rash Other (comments) Oozing sores Tetracycline Itching Recent Documentation Height Weight Breastfeeding? BMI OB Status Smoking Status 1.651 m 53.5 kg No 19.64 kg/m2 Postmenopausal Never Smoker Emergency Contacts Name Discharge Info Relation Home Work Mobile [facility] DISCHARGE CAREGIVER [3] Son [22] [phone] Patient Belongings The following personal items are in your possession at time of discharge: 
  Dental Appliances: None  Visual Aid: Glasses Please provide this summary of care documentation to your next provider. Signatures-by signing, you are acknowledging that this After Visit Summary has been reviewed with you and you have received a copy. Patient Signature:  ____________________________________________________________ Date:  ____________________________________________________________  
  
Shea Moritz Provider Signature:  ____________________________________________________________ Date:  ____________________________________________________________

## 2018-02-14 NOTE — PERIOP NOTES
Patient medication teaching was done for pantoprazole, linzess, carafate. [address] were included with discharge instructions. Opportunities for questions given and clarification was provided. patient and son verbalized understanding and acceptance. Medication side effects guide given and reviewed.

## 2018-02-14 NOTE — PROCEDURES
[address] [facility] ZEB Solis MD  [phone]      2018    Esophagogastroduodenoscopy & Colonoscopy Procedure Note  Cristi Alves  : 1960  [employer] Medical Record Number: [id number]      Indications:    Abdominal pain generalized,  CT showing questionable diverticulitis but despite antibiotic therapy abdominal complaints continue  Intraabdominal mass noted on CT and evaluated by 2 surgeons and apparently felt not to be significant - radiology notes suggest differential diagnosis for this finding includes omental infarct as sequelae of previous surgery. Referring Physician:  Pili Zabala MD  Anesthesia/Sedation: Conscious Sedation/Moderate Sedation/MAC  Endoscopist:  Dr. Roddy Flores  Complications:  None  Estimated Blood Loss:  None    Permit:  The indications, risks, benefits and alternatives were reviewed with the patient or their decision maker who was provided an opportunity to ask questions and all questions were answered. The specific risks of esophagogastroduodenoscopy with conscious sedation were reviewed, including but not limited to anesthetic complication, bleeding, adverse drug reaction, missed lesion, infection, IV site reactions, and intestinal perforation which would lead to the need for surgical repair. Alternatives to EGD and colonoscopy including radiographic imaging, observation without testing, or laboratory testing were reviewed as well as the limitations of those alternatives discussed. After considering the options and having all their questions answered, the patient or their decision maker provided both verbal and written consent to proceed.       -----------EGD------------   Procedure in Detail:  After obtaining informed consent, positioning of the patient in the left lateral decubitus position, and conduction of a pre-procedure pause or \"time out\" the endoscope was introduced into the mouth and advanced to the duodenum. A careful inspection was made, and findings or interventions are described below. Findings:   Esophagus:normal  Stomach: Status post zac-Y gastric bypass. Duodenum/jejunum: There are two linear ulcerations of the jejunal mucosa just distal to the anastomosis; otherwise the jejunum is normal        ----------Colonoscopy-----------    Procedure in Detail:  After obtaining informed consent, positioning of the patient in the left lateral decubitus position, and conduction of a pre-procedure pause or \"time out\" the endoscope was introduced into the anus and advanced to the cecum, which was identified by the ileocecal valve and appendiceal orifice. The quality of the colonic preparation was good. A careful inspection was made as the colonoscope was withdrawn, findings and interventions are described below. Findings:   No inflammatory or neoplastic changes of the colonic mucosa are noted. There is diverticulosis of the sigmoid but no evidence whatsoever of ongoing diverticulitis. In the rectum, medium internal hemorrhoids are noted without bleeding.      ------------------------------  Specimens:    See above    Complications:   None; patient tolerated the procedure well. Impressions:  EGD:  Ulcers of jejunum may explain her complaints of pain; but today she minimized her abdominal complaints and instead asked for admission to evaluate dizziness. Colonoscopy: Normal colonoscopy to the cecum, with no evidence of neoplasia, no significant diverticular disease, and no mucosal abnormality. Recommendations:   - For colon cancer screening in this average-risk patient, colonoscopy may be repeated in 10 years. - For the finding of ulcers of jejunum near anastomosis: acid suppression with a proton pump inhibitor and 2 weeks carafate. , -No NSAIDS, -Await biopsies    IF she reports continued/recurrent abdominal pain despite therapy for the ulcers then I suggest:  Re-consultation of her surgeon at [facility] with specific query as to whether the abdominal mass noted on CT requires excision, biopsy or can be simply observed. After such, if there is still pain that is felt unrelated to the CT finding I would repeat EGD after 6-8 weeks of ulcer therapy to see if that finding resolved or not. IF she requests or requires evaluation of her complaint of dizziness I will advise she seek care at PCP or local ED. Thank you for entrusting me with this patient's care. Please do not hesitate to contact me with any questions or if I can be of assistance with any of your other patients' GI needs.     Signed By: Bhakti Aguilar MD                        February 14, 2018

## 2018-02-19 ENCOUNTER — HOSPITAL ENCOUNTER (EMERGENCY)
Age: 58
Discharge: HOME | End: 2018-02-19
Attending: EMERGENCY MEDICINE
Payer: MEDICARE

## 2018-02-19 VITALS
SYSTOLIC BLOOD PRESSURE: 136 MMHG | HEART RATE: 77 BPM | OXYGEN SATURATION: 97 % | HEIGHT: 62 IN | WEIGHT: 110 LBS | RESPIRATION RATE: 15 BRPM | BODY MASS INDEX: 20.24 KG/M2 | DIASTOLIC BLOOD PRESSURE: 90 MMHG | TEMPERATURE: 97.6 F

## 2018-02-19 DIAGNOSIS — T50.905A MEDICATION REACTION, INITIAL ENCOUNTER: Primary | ICD-10-CM

## 2018-02-19 LAB
ANION GAP BLD CALC-SCNC: 16 MMOL/L (ref 5–15)
BUN BLD-MCNC: 6 MG/DL (ref 9–20)
CA-I BLD-MCNC: 1.24 MMOL/L (ref 1.12–1.32)
CHLORIDE BLD-SCNC: 109 MMOL/L (ref 98–107)
CO2 BLD-SCNC: 23 MMOL/L (ref 21–32)
CREAT BLD-MCNC: 0.7 MG/DL (ref 0.6–1.3)
GLUCOSE BLD-MCNC: 116 MG/DL (ref 65–100)
HCT VFR BLD CALC: 39 % (ref 35–47)
HGB BLD-MCNC: 13.3 GM/DL (ref 11.5–16)
POTASSIUM BLD-SCNC: 3.5 MMOL/L (ref 3.5–5.1)
SERVICE CMNT-IMP: ABNORMAL
SODIUM BLD-SCNC: 143 MMOL/L (ref 136–145)

## 2018-02-19 PROCEDURE — 99284 EMERGENCY DEPT VISIT MOD MDM: CPT

## 2018-02-19 PROCEDURE — 80047 BASIC METABLC PNL IONIZED CA: CPT

## 2018-02-19 NOTE — ED TRIAGE NOTES
Patient arrives with EMS, states that she normally takes Clonazepam at night, had one at noon today. Co-workers were concerned because she seemed more drowsy than normal, patient was found asleep in her car attempting to drive. Per EMS patient does not want to be seen in the ER.

## 2018-02-19 NOTE — ED NOTES
Patient given discharge instruction by Meeta Flores. Verbalized understanding, pt discharge home with son.

## 2018-02-19 NOTE — ED PROVIDER NOTES
HPI Comments: 61 y.o. female with past medical history significant for depression, anxiety, high cholesterol, gastric bypass, asthma, MVP, HTN, cholecystectomy, and abdominoplasty who presents from work via EMS with chief complaint of drowziness. The pt reports that she took a dose of her clonazepam a noon which she normally takes at night. The pt went to work and her coworkers noticed that she was drowsy. They found her in the parking lot attempting to drive and called for EMS. The has no current complaints other than feeling sleepy. The pt denies CP, abdominal pain, SOB, SI,HA,  and HI. There are no other acute medical concerns at this time. Social hx: nonsmoker, no EtOH use  PCP: Issac Pineda MD    Note written by Adam Vonda Soto, as dictated by Rhett Ramos MD 5:32 PM      The history is provided by the patient. No  was used. Past Medical History:   Diagnosis Date    Abdominal pain 5/3/2013    Anxiety     Arthritis     Asthma     Depression     Hypercholesterolemia     Hypertension     PRIOR TO GASTRIC BYPASS    Joint pain     Kidney stones     Murmur, heart     Muscle ache     and pain    Nausea & vomiting     Other ill-defined conditions(799.89) JAN 2013    \"BAD BRONCHITIS & PNEUMONIA\"    Other ill-defined conditions(799.89)     MITRAL VALVE PROLAPSE. DX. WHEN LITTLE.  Psychiatric disorder     DEPRESSION. BIPOLAR.  Status following gastric bypass for weight loss 5/3/2013    In 2009 in [place], [place] with Dr. London Levin.  Trouble in sleeping     Unspecified adverse effect of anesthesia     \"WOKE  [address] SO TERRIFIED\"    Unspecified adverse effect of anesthesia     LAST COUPLE SURGERIES, FIBEROPTIC USED. THROAT HAS CURVE.      Unspecified adverse effect of anesthesia     states need fibro optic for intubation due to 'curature in throat'       Past Surgical History:   Procedure Laterality Date    COLONOSCOPY N/A 2/14/2018    COLONOSCOPY, EGD performed by Tamia Valladares MD at [address] [facility]  [address]       x 2    HX CHOLECYSTECTOMY      HX GASTRIC BYPASS  mid 2000    in [place] HX GI  5/8/13    lap SBR w/ reconstruction of Jej., resection of peritoneal mass by Dr Ubaldo Valdez HX GI      bowel obstruction    mid 2000    HX HERNIA REPAIR      X 3 HERNIAS REPAIRED. UNSURE WHICH KIND.  HX ORTHOPAEDIC      RIGHT KNEE SURGERY AT AGE 11 YRS.    HX ORTHOPAEDIC      LT ANKLE FX. REPAIR. 6 SCREWS    HX OTHER SURGICAL      \"strangled bowel\"    HX OTHER SURGICAL      ABDOMINOPLASTY WITH INTERCEPTED HERNIAS X 4-5 DONE WITH MESH PLACEMENT    NEUROLOGICAL PROCEDURE UNLISTED  2011    CERVICAL FUSION. \"UNABLE TO MOVE HEAD SIDE TO SIDE, UP OR BACK\" PER PT. Family History:   Problem Relation Age of Onset    Cancer Mother      breast    Post-op Nausea/Vomiting Mother     Depression Mother     Cancer Father      prostate    Heart Disease Brother      MI    Anesth Problems Neg Hx        Social History     Social History    Marital status: SINGLE     Spouse name: N/A    Number of children: N/A    Years of education: N/A     Occupational History    Not on file. Social History Main Topics    Smoking status: Never Smoker    Smokeless tobacco: Never Used    Alcohol use No    Drug use: No    Sexual activity: Not on file     Other Topics Concern    Not on file     Social History Narrative         ALLERGIES: Latex; Erythromycin; Morphine; Pcn [penicillins]; Tape [adhesive]; and Tetracycline    Review of Systems   Constitutional: Negative. Negative for appetite change, fever and unexpected weight change. Drowzy   HENT: Negative. Negative for ear pain, hearing loss, nosebleeds, rhinorrhea, sore throat and trouble swallowing. Respiratory: Negative. Negative for cough, chest tightness and shortness of breath. Cardiovascular: Negative.   Negative for chest pain and palpitations. Gastrointestinal: Negative. Negative for abdominal distention, abdominal pain, blood in stool and vomiting. Endocrine: Negative. Genitourinary: Negative for dysuria and hematuria. Musculoskeletal: Negative. Negative for back pain and myalgias. Skin: Negative. Negative for rash. Allergic/Immunologic: Negative. Neurological: Negative. Negative for dizziness, syncope, weakness and numbness. Hematological: Negative. Psychiatric/Behavioral: Negative. Negative for suicidal ideas. All other systems reviewed and are negative. Vitals:    02/19/18 1721   BP: (!) 151/91   Pulse: 77   Resp: 15   Temp: 97.6 °F (36.4 °C)   SpO2: 100%   Weight: 49.9 kg (110 lb)   Height: 5' 2\" (1.575 m)            Physical Exam   Constitutional: She is oriented to person, place, and time. She appears well-developed and well-nourished. No distress. Mildly sedated appearing   HENT:   Head: Normocephalic and atraumatic. Right Ear: External ear normal.   Left Ear: External ear normal.   Nose: Nose normal.   Mouth/Throat: Oropharynx is clear and moist.   Eyes: Conjunctivae and EOM are normal. Pupils are equal, round, and reactive to light. Neck: Normal range of motion. Neck supple. No JVD present. No thyromegaly present. Cardiovascular: Normal rate, regular rhythm, normal heart sounds and intact distal pulses. No murmur heard. Pulmonary/Chest: Effort normal and breath sounds normal. No respiratory distress. She has no wheezes. She has no rales. Abdominal: Soft. Bowel sounds are normal. She exhibits no distension. There is no tenderness. Musculoskeletal: Normal range of motion. She exhibits no edema. Neurological: She is alert and oriented to person, place, and time. No cranial nerve deficit. Skin: Skin is warm and dry. No rash noted. Psychiatric: She has a normal mood and affect.  Her behavior is normal. Thought content normal.    Note written by Vonda Bocanegra, as dictated by María Johnson MD 5:34 PM      MDM  Number of Diagnoses or Management Options  Medication reaction, initial encounter:   Diagnosis management comments: Sedation secondary to benzodiazapine use. No other acute process suspected. ED Course       Procedures  5:58 PM  chem8 is unremarkable. Will discharge home with a ride for safety.

## 2018-02-19 NOTE — DISCHARGE INSTRUCTIONS
Side Effects of Medicine: Care Instructions  Your Care Instructions  Medicines are a big part of treatment for many health problems. But all medicines have side effects. Often these are mild problems. They might include a dry mouth or upset stomach. But sometimes medicines can cause dangerous side effects. One example is a bad allergic reaction. The best treatment will depend on what side effects you have. If you have a serious side effect, you may need to stop taking the medicine. You may also need to take another medicine to treat the side effect. If you have a mild side effect, it may go away after you take the medicine for a while. The doctor has checked you carefully, but problems can develop later. If you notice any problems or new symptoms, get medical treatment right away. Follow-up care is a key part of your treatment and safety. Be sure to make and go to all appointments, and call your doctor if you are having problems. It's also a good idea to know your test results and keep a list of the medicines you take. How can you care for yourself at home? · Be safe with medicines. Take your medicines exactly as prescribed. Call your doctor if you think you are having a problem with your medicine. · Call your doctor if side effects bother you and you wonder if you should keep taking a medicine. Your doctor may be able to lower your dose or change your medicine. Do not suddenly quit taking your medicine unless a doctor tells you to. · Make sure your doctor has a list of all the medicines, vitamins, supplements, and herbal remedies you take. Ask about side effects. When should you call for help? Call 911 anytime you think you may need emergency care. For example, call if:  · You have symptoms of a severe allergic reaction. These may include:  ¨ Sudden raised, red areas (hives) all over your body. ¨ Swelling of the throat, mouth, lips, or tongue. ¨ Trouble breathing. ¨ Passing out (losing consciousness). Or you may feel very lightheaded or suddenly feel weak, confused, or restless. Call your doctor now or seek immediate medical care if:  · You have symptoms of an allergic reaction, such as:  ¨ A rash or hives (raised, red areas on the skin). ¨ Itching. ¨ Swelling. ¨ Belly pain, nausea, or vomiting. Watch closely for changes in your health, and be sure to contact your doctor if:  · You think you are having a new problem with your medicine. · You do not get better as expected. Where can you learn more? Go to [employer]  Enter D152 in the search box to learn more about \"Side Effects of Medicine: Care Instructions. \"   © 5363-6827 [employer]wise, [employer]. Care instructions adapted under license by [employer] (which disclaims liability or warranty for this information). This care instruction is for use with your licensed healthcare professional. If you have questions about a medical condition or this instruction, always ask your healthcare professional. [place] [address] any warranty or liability for your use of this information.   Content Version: 81.2.131675; Current as of: November 20, 2015

## 2020-01-29 ENCOUNTER — HOSPITAL ENCOUNTER (OUTPATIENT)
Dept: MAMMOGRAPHY | Age: 60
Discharge: HOME | End: 2020-01-29
Attending: INTERNAL MEDICINE
Payer: MEDICARE

## 2020-01-29 DIAGNOSIS — M81.0 AGE RELATED OSTEOPOROSIS: ICD-10-CM

## 2020-01-29 PROCEDURE — 77080 DXA BONE DENSITY AXIAL: CPT

## 2020-02-06 RX ORDER — ZOLEDRONIC ACID 5 MG/100ML
5 INJECTION, SOLUTION INTRAVENOUS ONCE
Status: COMPLETED | OUTPATIENT
Start: 2020-02-11 | End: 2020-02-11

## 2020-02-06 RX ORDER — SODIUM CHLORIDE 9 MG/ML
25 INJECTION, SOLUTION INTRAVENOUS CONTINUOUS
Status: DISPENSED | OUTPATIENT
Start: 2020-02-11 | End: 2020-02-11

## 2020-02-11 ENCOUNTER — HOSPITAL ENCOUNTER (OUTPATIENT)
Dept: INFUSION THERAPY | Age: 60
Discharge: HOME | End: 2020-02-11
Payer: MEDICARE

## 2020-02-11 VITALS
HEART RATE: 58 BPM | HEIGHT: 65 IN | TEMPERATURE: 97 F | BODY MASS INDEX: 25.84 KG/M2 | OXYGEN SATURATION: 96 % | DIASTOLIC BLOOD PRESSURE: 52 MMHG | SYSTOLIC BLOOD PRESSURE: 103 MMHG | WEIGHT: 155.1 LBS | RESPIRATION RATE: 16 BRPM

## 2020-02-11 LAB
ALBUMIN SERPL-MCNC: 3.4 G/DL (ref 3.5–5)
ANION GAP SERPL CALC-SCNC: 5 MMOL/L (ref 5–15)
BUN SERPL-MCNC: 13 MG/DL (ref 6–20)
BUN/CREAT SERPL: 18 (ref 12–20)
CALCIUM SERPL-MCNC: 8.5 MG/DL (ref 8.5–10.1)
CHLORIDE SERPL-SCNC: 108 MMOL/L (ref 97–108)
CO2 SERPL-SCNC: 25 MMOL/L (ref 21–32)
CREAT SERPL-MCNC: 0.73 MG/DL (ref 0.55–1.02)
GLUCOSE SERPL-MCNC: 97 MG/DL (ref 65–100)
PHOSPHATE SERPL-MCNC: 3.1 MG/DL (ref 2.6–4.7)
POTASSIUM SERPL-SCNC: 4.9 MMOL/L (ref 3.5–5.1)
SODIUM SERPL-SCNC: 138 MMOL/L (ref 136–145)

## 2020-02-11 PROCEDURE — 74011000258 HC RX REV CODE- 258: Performed by: INTERNAL MEDICINE

## 2020-02-11 PROCEDURE — 96374 THER/PROPH/DIAG INJ IV PUSH: CPT

## 2020-02-11 PROCEDURE — 80069 RENAL FUNCTION PANEL: CPT

## 2020-02-11 PROCEDURE — 74011250636 HC RX REV CODE- 250/636: Performed by: INTERNAL MEDICINE

## 2020-02-11 PROCEDURE — 36415 COLL VENOUS BLD VENIPUNCTURE: CPT

## 2020-02-11 RX ORDER — CLONAZEPAM 1 MG/1
1 TABLET ORAL 2 TIMES DAILY
COMMUNITY

## 2020-02-11 RX ORDER — TIZANIDINE 2 MG/1
2 TABLET ORAL
COMMUNITY

## 2020-02-11 RX ORDER — ATORVASTATIN CALCIUM 20 MG/1
20 TABLET, FILM COATED ORAL DAILY
COMMUNITY

## 2020-02-11 RX ORDER — TRAZODONE HYDROCHLORIDE 150 MG/1
150 TABLET ORAL
COMMUNITY

## 2020-02-11 RX ORDER — PREGABALIN 300 MG/1
300 CAPSULE ORAL
COMMUNITY

## 2020-02-11 RX ADMIN — SODIUM CHLORIDE 25 ML/HR: 900 INJECTION, SOLUTION INTRAVENOUS at 15:30

## 2020-02-11 RX ADMIN — ZOLEDRONIC ACID 5 MG: 5 INJECTION, SOLUTION INTRAVENOUS at 15:37

## 2020-02-11 NOTE — PROGRESS NOTES
Outpatient Infusion Center Short Visit Progress Note     Patient admitted to [facility] for [address] ambulatory in stable condition. Assessment completed. Patient c/o severe back pain and fatigue. Vital Signs:  Visit Vitals  /53   Pulse (!) 57   Temp 96.1 °F (35.6 °C)   Resp 16   Ht 5' 5\" (1.651 m)   Wt 70.4 kg (155 lb 1.6 oz)   SpO2 96%   Breastfeeding No   BMI 25.81 kg/m²     24G PIV placed in right AC with positive blood return. Labs drawn and sent for processing. Lab Results:  Recent Results (from the past 12 hour(s))   RENAL FUNCTION PANEL    Collection Time: 02/11/20  2:28 PM   Result Value Ref Range    Sodium 138 136 - 145 mmol/L    Potassium 4.9 3.5 - 5.1 mmol/L    Chloride 108 97 - 108 mmol/L    CO2 25 21 - 32 mmol/L    Anion gap 5 5 - 15 mmol/L    Glucose 97 65 - 100 mg/dL    BUN 13 6 - 20 MG/DL    Creatinine 0.73 0.55 - 1.02 MG/DL    BUN/Creatinine ratio 18 12 - 20      GFR est AA >60 >60 ml/min/1.73m2    GFR est non-AA >60 >60 ml/min/1.73m2    Calcium 8.5 8.5 - 10.1 MG/DL    Phosphorus 3.1 2.6 - 4.7 MG/DL    Albumin 3.4 (L) 3.5 - 5.0 g/dL     All labs within treatment parameters. Medications:  Medications Administered     0.9% sodium chloride infusion     Admin Date  02/11/2020 Action  New Bag Dose  25 mL/hr Rate  25 mL/hr Route  IntraVENous Administered By  Ralph Abdalla, RN          zoledronic acid (RECLAST) IVPB 5 mg     Admin Date  02/11/2020 Action  Given Dose  5 mg Rate  400 mL/hr Route  IntraVENous Administered By  Ralph Abdalla, RN              9449 Patient tolerated treatment well. Patient discharged from [place] [address] ambulatory in no distress at 1615. PIV with positive blood return at end of treatment, flushed with saline and removed. Pressure dressing applied. Patient aware of next appointment.     Future Appointments   Date Time Provider Collin Casey   2/12/2020  3:00 PM [facility]

## 2020-02-12 ENCOUNTER — HOSPITAL ENCOUNTER (OUTPATIENT)
Dept: MRI IMAGING | Age: 60
Discharge: HOME | End: 2020-02-12
Attending: NURSE PRACTITIONER
Payer: MEDICARE

## 2020-02-12 DIAGNOSIS — M47.817 OSTEOARTHRITIS OF SPINE WITHOUT MYELOPATHY OR RADICULOPATHY, LUMBOSACRAL REGION: ICD-10-CM

## 2020-02-12 PROCEDURE — 72158 MRI LUMBAR SPINE W/O & W/DYE: CPT

## 2020-02-12 PROCEDURE — A9575 INJ GADOTERATE MEGLUMI 0.1ML: HCPCS | Performed by: RADIOLOGY

## 2020-02-12 PROCEDURE — 74011250636 HC RX REV CODE- 250/636: Performed by: RADIOLOGY

## 2020-02-12 RX ORDER — GADOTERATE MEGLUMINE 376.9 MG/ML
14 INJECTION INTRAVENOUS
Status: COMPLETED | OUTPATIENT
Start: 2020-02-12 | End: 2020-02-12

## 2020-02-12 RX ADMIN — GADOTERATE MEGLUMINE 14 ML: 376.9 INJECTION INTRAVENOUS at 16:18

## 2022-03-24 ENCOUNTER — TRANSCRIBE ORDER (OUTPATIENT)
Dept: SCHEDULING | Age: 62
End: 2022-03-24

## 2022-03-24 DIAGNOSIS — M47.817 LUMBOSACRAL SPONDYLOSIS WITHOUT MYELOPATHY: Primary | ICD-10-CM

## 2022-03-24 DIAGNOSIS — Z12.31 SCREENING MAMMOGRAM FOR HIGH-RISK PATIENT: Primary | ICD-10-CM

## 2022-03-31 ENCOUNTER — HOSPITAL ENCOUNTER (OUTPATIENT)
Dept: MRI IMAGING | Age: 62
Discharge: HOME | End: 2022-03-31
Attending: NURSE PRACTITIONER
Payer: MEDICARE

## 2022-03-31 DIAGNOSIS — M47.817 LUMBOSACRAL SPONDYLOSIS WITHOUT MYELOPATHY: ICD-10-CM

## 2022-03-31 PROCEDURE — 72158 MRI LUMBAR SPINE W/O & W/DYE: CPT

## 2022-03-31 PROCEDURE — A9576 INJ PROHANCE MULTIPACK: HCPCS | Performed by: NURSE PRACTITIONER

## 2022-03-31 PROCEDURE — 74011250636 HC RX REV CODE- 250/636: Performed by: NURSE PRACTITIONER

## 2022-03-31 RX ADMIN — GADOTERIDOL 17 ML: 279.3 INJECTION, SOLUTION INTRAVENOUS at 16:45

## 2024-03-14 ENCOUNTER — TELEPHONE (OUTPATIENT)
Age: 64
End: 2024-03-14

## 2024-03-14 NOTE — TELEPHONE ENCOUNTER
Patient called asking for us to fax her medical records to the new bariatric doctor she is seeing where she now lives in [place].    Dr. Tino Liu / fax #: [phone]

## (undated) DEVICE — 3M™ CUROS™ DISINFECTING CAP FOR NEEDLELESS CONNECTORS 270/CARTON 20 CARTONS/CASE CFF1-270: Brand: CUROS™

## (undated) DEVICE — KIT COLON W/ 1.1OZ LUB AND 2 END

## (undated) DEVICE — CATH IV AUTOGRD BC PNK 20GA 25 -- INSYTE

## (undated) DEVICE — Device

## (undated) DEVICE — CONTAINER SPEC 20 ML LID NEUT BUFF FORMALIN 10 % POLYPR STS

## (undated) DEVICE — FORCEPS BX L240CM JAW DIA2.8MM L CAP W/ NDL MIC MESH TOOTH

## (undated) DEVICE — 1200 GUARD II KIT W/5MM TUBE W/O VAC TUBE: Brand: GUARDIAN

## (undated) DEVICE — SOLIDIFIER MEDC 1200ML -- CONVERT TO 356117

## (undated) DEVICE — BAG BELONG PT PERS CLEAR HANDL

## (undated) DEVICE — SYRINGE 50ML E/T

## (undated) DEVICE — CANN NASAL O2 CAPNOGRAPHY AD -- FILTERLINE

## (undated) DEVICE — SET GRAV CK VLV NEEDLESS ST 3 GANGED 4WAY STPCOCK HI FLO 10

## (undated) DEVICE — BAG SPEC BIOHZRD 10 X 10 IN --

## (undated) DEVICE — BITEBLOCK ENDOSCP 60FR MAXI WHT POLYETH STURDY W/ VELC WVN

## (undated) DEVICE — SIMPLICITY FLUFF UNDERPAD 23X36, MODERATE: Brand: SIMPLICITY

## (undated) DEVICE — KENDALL RADIOLUCENT FOAM MONITORING ELECTRODE -RECTANGULAR SHAPE: Brand: KENDALL

## (undated) DEVICE — ADULT SPO2 SENSOR: Brand: NELLCOR